# Patient Record
Sex: FEMALE | Race: WHITE | NOT HISPANIC OR LATINO | Employment: OTHER
[De-identification: names, ages, dates, MRNs, and addresses within clinical notes are randomized per-mention and may not be internally consistent; named-entity substitution may affect disease eponyms.]

---

## 2019-02-18 ENCOUNTER — RECORDS - HEALTHEAST (OUTPATIENT)
Dept: ADMINISTRATIVE | Facility: OTHER | Age: 74
End: 2019-02-18

## 2019-07-01 ENCOUNTER — HOSPITAL ENCOUNTER (OUTPATIENT)
Dept: ULTRASOUND IMAGING | Facility: CLINIC | Age: 74
Discharge: HOME | End: 2019-07-01
Attending: OBSTETRICS & GYNECOLOGY | Admitting: OBSTETRICS & GYNECOLOGY
Payer: MEDICARE

## 2019-07-01 DIAGNOSIS — R92.8 ABNORMAL MAMMOGRAM: ICD-10-CM

## 2019-07-01 PROCEDURE — 76642 ULTRASOUND BREAST LIMITED: CPT | Mod: RT

## 2019-07-27 ENCOUNTER — HOSPITAL ENCOUNTER (OUTPATIENT)
Age: 74
End: 2019-07-27
Attending: INTERNAL MEDICINE | Admitting: INTERNAL MEDICINE
Payer: MEDICARE

## 2019-07-27 ENCOUNTER — HOSPITAL ENCOUNTER (OUTPATIENT)
Facility: CLINIC | Age: 74
Setting detail: OBSERVATION
Discharge: HOME | End: 2019-07-28
Attending: EMERGENCY MEDICINE | Admitting: INTERNAL MEDICINE
Payer: MEDICARE

## 2019-07-27 ENCOUNTER — TRANSFERRED RECORDS (OUTPATIENT)
Dept: HEALTH INFORMATION MANAGEMENT | Facility: CLINIC | Age: 74
End: 2019-07-27

## 2019-07-27 DIAGNOSIS — K57.92 DIVERTICULITIS: Primary | ICD-10-CM

## 2019-07-27 DIAGNOSIS — K57.32 DIVERTICULITIS OF COLON: ICD-10-CM

## 2019-07-27 LAB
ANION GAP SERPL CALCULATED.3IONS-SCNC: 6 MMOL/L (ref 3–14)
BASOPHILS # BLD AUTO: 0 10E9/L (ref 0–0.2)
BASOPHILS NFR BLD AUTO: 0.2 %
BUN SERPL-MCNC: 31 MG/DL (ref 7–30)
CALCIUM SERPL-MCNC: 8.9 MG/DL (ref 8.5–10.1)
CHLORIDE SERPL-SCNC: 101 MMOL/L (ref 94–109)
CO2 SERPL-SCNC: 26 MMOL/L (ref 20–32)
CREAT SERPL-MCNC: 1.32 MG/DL (ref 0.52–1.04)
CREAT SERPL-MCNC: 1.5 MG/DL
DIFFERENTIAL METHOD BLD: ABNORMAL
EOSINOPHIL # BLD AUTO: 0 10E9/L (ref 0–0.7)
EOSINOPHIL NFR BLD AUTO: 0 %
ERYTHROCYTE [DISTWIDTH] IN BLOOD BY AUTOMATED COUNT: 13.9 % (ref 10–15)
GFR SERPL CREATININE-BSD FRML MDRD: 39 ML/MIN/{1.73_M2}
GLUCOSE SERPL-MCNC: 110 MG/DL (ref 70–99)
GLUCOSE SERPL-MCNC: 123 MG/DL (ref 70–99)
HCT VFR BLD AUTO: 39 % (ref 35–47)
HGB BLD-MCNC: 13.1 G/DL (ref 11.7–15.7)
IMM GRANULOCYTES # BLD: 0.1 10E9/L (ref 0–0.4)
IMM GRANULOCYTES NFR BLD: 0.4 %
LYMPHOCYTES # BLD AUTO: 0.9 10E9/L (ref 0.8–5.3)
LYMPHOCYTES NFR BLD AUTO: 6.5 %
MCH RBC QN AUTO: 31 PG (ref 26.5–33)
MCHC RBC AUTO-ENTMCNC: 33.6 G/DL (ref 31.5–36.5)
MCV RBC AUTO: 92 FL (ref 78–100)
MONOCYTES # BLD AUTO: 0.8 10E9/L (ref 0–1.3)
MONOCYTES NFR BLD AUTO: 6 %
NEUTROPHILS # BLD AUTO: 11.8 10E9/L (ref 1.6–8.3)
NEUTROPHILS NFR BLD AUTO: 86.9 %
NRBC # BLD AUTO: 0 10*3/UL
NRBC BLD AUTO-RTO: 0 /100
PLATELET # BLD AUTO: 338 10E9/L (ref 150–450)
POTASSIUM SERPL-SCNC: 3.1 MMOL/L (ref 3.4–5.3)
POTASSIUM SERPL-SCNC: 3.5 MMOL/L (ref 3.5–5.1)
RBC # BLD AUTO: 4.22 10E12/L (ref 3.8–5.2)
SODIUM SERPL-SCNC: 133 MMOL/L (ref 133–144)
WBC # BLD AUTO: 13.6 10E9/L (ref 4–11)

## 2019-07-27 PROCEDURE — 25000132 ZZH RX MED GY IP 250 OP 250 PS 637: Mod: GY | Performed by: INTERNAL MEDICINE

## 2019-07-27 PROCEDURE — 25800030 ZZH RX IP 258 OP 636: Performed by: INTERNAL MEDICINE

## 2019-07-27 PROCEDURE — G0378 HOSPITAL OBSERVATION PER HR: HCPCS

## 2019-07-27 PROCEDURE — 36415 COLL VENOUS BLD VENIPUNCTURE: CPT | Performed by: INTERNAL MEDICINE

## 2019-07-27 PROCEDURE — 25000128 H RX IP 250 OP 636: Performed by: EMERGENCY MEDICINE

## 2019-07-27 PROCEDURE — 80048 BASIC METABOLIC PNL TOTAL CA: CPT | Performed by: EMERGENCY MEDICINE

## 2019-07-27 PROCEDURE — 99285 EMERGENCY DEPT VISIT HI MDM: CPT | Mod: 25

## 2019-07-27 PROCEDURE — 83605 ASSAY OF LACTIC ACID: CPT | Performed by: INTERNAL MEDICINE

## 2019-07-27 PROCEDURE — 85025 COMPLETE CBC W/AUTO DIFF WBC: CPT | Performed by: EMERGENCY MEDICINE

## 2019-07-27 PROCEDURE — 99220 ZZC INITIAL OBSERVATION CARE,LEVL III: CPT | Performed by: INTERNAL MEDICINE

## 2019-07-27 PROCEDURE — 96365 THER/PROPH/DIAG IV INF INIT: CPT

## 2019-07-27 RX ORDER — HYDROMORPHONE HYDROCHLORIDE 1 MG/ML
0.2 INJECTION, SOLUTION INTRAMUSCULAR; INTRAVENOUS; SUBCUTANEOUS
Status: DISCONTINUED | OUTPATIENT
Start: 2019-07-27 | End: 2019-07-28

## 2019-07-27 RX ORDER — SODIUM CHLORIDE 9 MG/ML
INJECTION, SOLUTION INTRAVENOUS CONTINUOUS
Status: DISCONTINUED | OUTPATIENT
Start: 2019-07-27 | End: 2019-07-28 | Stop reason: HOSPADM

## 2019-07-27 RX ORDER — ASPIRIN 81 MG/1
81 TABLET, CHEWABLE ORAL AT BEDTIME
Status: ON HOLD | COMMUNITY
End: 2019-11-22

## 2019-07-27 RX ORDER — ACETAMINOPHEN 325 MG/1
650 TABLET ORAL EVERY 4 HOURS PRN
Status: DISCONTINUED | OUTPATIENT
Start: 2019-07-27 | End: 2019-07-28 | Stop reason: HOSPADM

## 2019-07-27 RX ORDER — POTASSIUM CHLORIDE 1500 MG/1
20-40 TABLET, EXTENDED RELEASE ORAL
Status: DISCONTINUED | OUTPATIENT
Start: 2019-07-27 | End: 2019-07-28 | Stop reason: HOSPADM

## 2019-07-27 RX ORDER — POTASSIUM CHLORIDE 7.45 MG/ML
10 INJECTION INTRAVENOUS
Status: DISCONTINUED | OUTPATIENT
Start: 2019-07-27 | End: 2019-07-28 | Stop reason: HOSPADM

## 2019-07-27 RX ORDER — POTASSIUM CHLORIDE 29.8 MG/ML
20 INJECTION INTRAVENOUS
Status: DISCONTINUED | OUTPATIENT
Start: 2019-07-27 | End: 2019-07-28 | Stop reason: HOSPADM

## 2019-07-27 RX ORDER — ERTAPENEM 1 G/1
1 INJECTION, POWDER, LYOPHILIZED, FOR SOLUTION INTRAMUSCULAR; INTRAVENOUS ONCE
Status: COMPLETED | OUTPATIENT
Start: 2019-07-27 | End: 2019-07-27

## 2019-07-27 RX ORDER — ONDANSETRON 2 MG/ML
4 INJECTION INTRAMUSCULAR; INTRAVENOUS EVERY 6 HOURS PRN
Status: DISCONTINUED | OUTPATIENT
Start: 2019-07-27 | End: 2019-07-28 | Stop reason: HOSPADM

## 2019-07-27 RX ORDER — HYDROCHLOROTHIAZIDE 12.5 MG/1
12.5 TABLET ORAL DAILY PRN
COMMUNITY
End: 2022-06-08

## 2019-07-27 RX ORDER — POTASSIUM CHLORIDE 1.5 G/1.58G
20-40 POWDER, FOR SOLUTION ORAL
Status: DISCONTINUED | OUTPATIENT
Start: 2019-07-27 | End: 2019-07-28 | Stop reason: HOSPADM

## 2019-07-27 RX ORDER — ACETAMINOPHEN 650 MG/1
650 SUPPOSITORY RECTAL EVERY 4 HOURS PRN
Status: DISCONTINUED | OUTPATIENT
Start: 2019-07-27 | End: 2019-07-28 | Stop reason: HOSPADM

## 2019-07-27 RX ORDER — CHLORAL HYDRATE 500 MG
1 CAPSULE ORAL DAILY
COMMUNITY

## 2019-07-27 RX ORDER — POTASSIUM CL/LIDO/0.9 % NACL 10MEQ/0.1L
10 INTRAVENOUS SOLUTION, PIGGYBACK (ML) INTRAVENOUS
Status: DISCONTINUED | OUTPATIENT
Start: 2019-07-27 | End: 2019-07-28 | Stop reason: HOSPADM

## 2019-07-27 RX ORDER — ERTAPENEM 1 G/1
1 INJECTION, POWDER, LYOPHILIZED, FOR SOLUTION INTRAMUSCULAR; INTRAVENOUS EVERY 24 HOURS
Status: DISCONTINUED | OUTPATIENT
Start: 2019-07-28 | End: 2019-07-27

## 2019-07-27 RX ORDER — AMLODIPINE BESYLATE 5 MG/1
5 TABLET ORAL EVERY EVENING
COMMUNITY
End: 2019-11-20

## 2019-07-27 RX ORDER — ONDANSETRON 4 MG/1
4 TABLET, ORALLY DISINTEGRATING ORAL EVERY 6 HOURS PRN
Status: DISCONTINUED | OUTPATIENT
Start: 2019-07-27 | End: 2019-07-28 | Stop reason: HOSPADM

## 2019-07-27 RX ORDER — NALOXONE HYDROCHLORIDE 0.4 MG/ML
.1-.4 INJECTION, SOLUTION INTRAMUSCULAR; INTRAVENOUS; SUBCUTANEOUS
Status: DISCONTINUED | OUTPATIENT
Start: 2019-07-27 | End: 2019-07-28 | Stop reason: HOSPADM

## 2019-07-27 RX ORDER — MULTIVITAMIN WITH IRON
1 TABLET ORAL EVERY EVENING
COMMUNITY

## 2019-07-27 RX ORDER — FAMOTIDINE 20 MG/1
20 TABLET, FILM COATED ORAL DAILY
COMMUNITY

## 2019-07-27 RX ADMIN — SODIUM CHLORIDE 1000 ML: 9 INJECTION, SOLUTION INTRAVENOUS at 22:08

## 2019-07-27 RX ADMIN — SODIUM CHLORIDE: 9 INJECTION, SOLUTION INTRAVENOUS at 23:41

## 2019-07-27 RX ADMIN — POTASSIUM CHLORIDE 40 MEQ: 1500 TABLET, EXTENDED RELEASE ORAL at 23:49

## 2019-07-27 RX ADMIN — ERTAPENEM SODIUM 1 G: 1 INJECTION, POWDER, LYOPHILIZED, FOR SOLUTION INTRAMUSCULAR; INTRAVENOUS at 22:08

## 2019-07-27 ASSESSMENT — MIFFLIN-ST. JEOR: SCORE: 1383.57

## 2019-07-27 ASSESSMENT — ENCOUNTER SYMPTOMS
ABDOMINAL PAIN: 1
FEVER: 0

## 2019-07-28 VITALS
HEIGHT: 63 IN | OXYGEN SATURATION: 95 % | DIASTOLIC BLOOD PRESSURE: 62 MMHG | TEMPERATURE: 98.2 F | WEIGHT: 202.7 LBS | BODY MASS INDEX: 35.91 KG/M2 | SYSTOLIC BLOOD PRESSURE: 103 MMHG | RESPIRATION RATE: 16 BRPM | HEART RATE: 99 BPM

## 2019-07-28 LAB
ANION GAP SERPL CALCULATED.3IONS-SCNC: 5 MMOL/L (ref 3–14)
BUN SERPL-MCNC: 25 MG/DL (ref 7–30)
CALCIUM SERPL-MCNC: 8.3 MG/DL (ref 8.5–10.1)
CHLORIDE SERPL-SCNC: 106 MMOL/L (ref 94–109)
CO2 SERPL-SCNC: 26 MMOL/L (ref 20–32)
CREAT SERPL-MCNC: 1.16 MG/DL (ref 0.52–1.04)
GFR SERPL CREATININE-BSD FRML MDRD: 46 ML/MIN/{1.73_M2}
GLUCOSE SERPL-MCNC: 107 MG/DL (ref 70–99)
LACTATE BLD-SCNC: 1.2 MMOL/L (ref 0.7–2)
POTASSIUM SERPL-SCNC: 4.2 MMOL/L (ref 3.4–5.3)
SODIUM SERPL-SCNC: 137 MMOL/L (ref 133–144)

## 2019-07-28 PROCEDURE — 96375 TX/PRO/DX INJ NEW DRUG ADDON: CPT

## 2019-07-28 PROCEDURE — 80048 BASIC METABOLIC PNL TOTAL CA: CPT | Performed by: INTERNAL MEDICINE

## 2019-07-28 PROCEDURE — 96361 HYDRATE IV INFUSION ADD-ON: CPT

## 2019-07-28 PROCEDURE — 36415 COLL VENOUS BLD VENIPUNCTURE: CPT | Performed by: INTERNAL MEDICINE

## 2019-07-28 PROCEDURE — 25000132 ZZH RX MED GY IP 250 OP 250 PS 637: Mod: GY | Performed by: INTERNAL MEDICINE

## 2019-07-28 PROCEDURE — 99217 ZZC OBSERVATION CARE DISCHARGE: CPT | Performed by: INTERNAL MEDICINE

## 2019-07-28 PROCEDURE — 25000128 H RX IP 250 OP 636: Performed by: INTERNAL MEDICINE

## 2019-07-28 PROCEDURE — G0378 HOSPITAL OBSERVATION PER HR: HCPCS

## 2019-07-28 RX ORDER — OXYCODONE HYDROCHLORIDE 5 MG/1
5-10 TABLET ORAL
Status: DISCONTINUED | OUTPATIENT
Start: 2019-07-28 | End: 2019-07-28 | Stop reason: HOSPADM

## 2019-07-28 RX ORDER — ASPIRIN 81 MG/1
81 TABLET, CHEWABLE ORAL AT BEDTIME
Status: DISCONTINUED | OUTPATIENT
Start: 2019-07-28 | End: 2019-07-28 | Stop reason: HOSPADM

## 2019-07-28 RX ORDER — AMLODIPINE BESYLATE 5 MG/1
5 TABLET ORAL EVERY EVENING
Status: DISCONTINUED | OUTPATIENT
Start: 2019-07-28 | End: 2019-07-28 | Stop reason: HOSPADM

## 2019-07-28 RX ORDER — OXYCODONE HYDROCHLORIDE 5 MG/1
5 TABLET ORAL EVERY 6 HOURS PRN
Qty: 10 TABLET | Refills: 0 | Status: ON HOLD | OUTPATIENT
Start: 2019-07-28 | End: 2019-11-21

## 2019-07-28 RX ORDER — FAMOTIDINE 20 MG/1
20 TABLET, FILM COATED ORAL DAILY PRN
Status: DISCONTINUED | OUTPATIENT
Start: 2019-07-28 | End: 2019-07-28 | Stop reason: HOSPADM

## 2019-07-28 RX ORDER — ONDANSETRON 4 MG/1
4 TABLET, ORALLY DISINTEGRATING ORAL EVERY 6 HOURS PRN
Qty: 10 TABLET | Refills: 0 | Status: ON HOLD | OUTPATIENT
Start: 2019-07-28 | End: 2019-11-21

## 2019-07-28 RX ADMIN — OXYCODONE HYDROCHLORIDE 5 MG: 5 TABLET ORAL at 10:47

## 2019-07-28 RX ADMIN — AMOXICILLIN AND CLAVULANATE POTASSIUM 1 TABLET: 875; 125 TABLET, FILM COATED ORAL at 09:09

## 2019-07-28 RX ADMIN — HYDROMORPHONE HYDROCHLORIDE 0.2 MG: 1 INJECTION, SOLUTION INTRAMUSCULAR; INTRAVENOUS; SUBCUTANEOUS at 04:57

## 2019-07-28 RX ADMIN — POTASSIUM CHLORIDE 20 MEQ: 1500 TABLET, EXTENDED RELEASE ORAL at 02:25

## 2019-07-28 NOTE — DISCHARGE SUMMARY
[facility]  Discharge Summary  Name: Susanne Escoto    MRN: [id number]  YOB: 1945    Age: 73 year old  Date of Discharge:  7/28/2019  Date of Admission: 7/27/2019  Primary Care Provider: Stephanie Seymour  Discharge Physician:  Richelle Amor MD  Discharging Service:  Hospitalist      Discharge Diagnoses:  1.  Acute diverticulitis  2.  Hypertension  3.  GERD  4.  Hypokalemia  5.  Acute kidney injury     Follow-ups Needed After Discharge   Follow-up with primary care doctor within 2 weeks    Unresulted Labs Ordered in the Past 30 Days of this Admission     No orders found from 10/16/2018 to 12/16/2018.        Hospital Course:  Susanne Escoto is a 73-year-old female with history of hypertension and GERD who was admitted on 7/27/2019 from urgent care with abdominal pain, nausea and vomiting and was found to have diverticulitis.    Acute diverticulitis: Patient developed severe abdominal pain in the lower abdominal region with nausea and emesis.  She was seen in urgent care and had a CT scan there which showed mild acute diverticulitis of the mid sigmoid colon with no evidence of perforation or collection.  She was sent to the emergency room and subsequently admitted to the hospital.  She was given a dose of ertapenem in the emergency room.  The morning of discharge she was started on oral Augmentin to treat her diverticulitis.  She was started on oral oxycodone for pain control.  She was able to tolerate a diet.  She will discharge home on Augmentin to treat her diverticulitis.  She will need to follow-up with primary care doctor within 2 weeks to ensure resolution of her symptoms.  Can also discuss possible colonoscopy.    History of hypertension: Prior to admission the patient was on hydrochlorothiazide and amlodipine.  Her blood pressure was relatively soft so medications were held.  She was instructed to check her blood pressure daily and resume amlodipine once her systolic pressure  "is 125 or greater.  She was instructed to hold her hydrochlorothiazide until she is eating and drinking normally.    GERD: Continue famotidine upon discharge.    Acute kidney injury: Baseline creatinine is approximately 1.  On admission creatinine was 1.32.  This has improved to 1.16 with IV fluids.  As above her hydrochlorothiazide is being held until she is back to eating and drinking normally.     Discharge Disposition:  Discharged to home, her daughter will be staying with her for at least the next 24 hours     Allergies:  No Known Allergies     Condition on Discharge:  Discharge condition: Stable   Discharge vitals: Blood pressure 107/64, pulse 99, temperature 97.3  F (36.3  C), temperature source Oral, resp. rate 20, height 1.6 m (5' 3\"), weight 91.9 kg (202 lb 11.2 oz), SpO2 99 %.   Code status on discharge: Full Code     History of Illness:  See detailed admission note for full details.    Physical Exam:  Blood pressure 107/64, pulse 99, temperature 97.3  F (36.3  C), temperature source Oral, resp. rate 20, height 1.6 m (5' 3\"), weight 91.9 kg (202 lb 11.2 oz), SpO2 99 %.  Wt Readings from Last 1 Encounters:   07/27/19 91.9 kg (202 lb 11.2 oz)     General: Alert, awake, no acute distress.  HEENT: Normocephalic, atraumatic, eyes anicteric and without scleral injection, EOMI, MMM.  Cardiac: RRR, normal S1, S2.  No m/g/r. No LE edema.  Pulmonary: Normal chest rise, normal work of breathing.  Lungs CTAB  Abdomen: soft, mild lower quadrant tenderness, non-distended.  Normoactive BS.  No guarding or rebound tenderness.  Extremities: no deformities.  Warm, well perfused.  Skin: no rashes or lesions noted.  Warm and Dry.  Neuro: No focal deficits noted.  Speech clear.  Coordination and strength grossly normal.  Psych: Appropriate affect. Alert and oriented x3    Procedures other than Imaging:  IV antibiotics, IVF     Imaging:  CT Abdomen Pelvis WO (07/27/2019): from outside   1.  Mild acute diverticulitis of the " mid sigmoid colon with no evidence of  perforation or collection.  2.  Moderate hiatal hernia.     Consultations:  Consultations This Hospital Stay   None     Recent Lab Results:  Recent Labs   Lab 07/27/19  2154   WBC 13.6*   HGB 13.1   HCT 39.0   MCV 92        Recent Labs   Lab 07/28/19  0622 07/27/19  2154    133   POTASSIUM 4.2 3.1*   CHLORIDE 106 101   CO2 26 26   ANIONGAP 5 6   * 110*   BUN 25 31*   CR 1.16* 1.32*   GFRESTIMATED 46* 39*   GFRESTBLACK 53* 45*   PJ 8.3* 8.9          Pending Results:    Unresulted Labs Ordered in the Past 30 Days of this Admission     No orders found for last 31 day(s).           Discharge Instructions and Follow-Up:   Discharge Orders      Reason for your hospital stay    You were hospitalized for abdominal pain and nausea and were found to have acute diverticulitis.  You will complete a course of antibiotics to treat this.     Follow-up and recommended labs and tests     Follow up with primary care provider, Stephanie Seymour, within 7 days for hospital follow- up.  No follow up labs or test are needed.     Activity    Your activity upon discharge: activity as tolerated     Discharge Instructions    1.  Your blood pressure is slightly low due to your current infection.  Please do not take your blood pressure medications tonight.  2.  Check your blood pressure tomorrow.  If the top number of your blood pressure reading is 125 or higher you can take your amlodipine.  3.  Do not take your hydrochlorothiazide until you are back to eating and drinking normally is taking this while you have your infection and decreased oral intake could lead to dehydration.     Full Code     Diet    Follow this diet upon discharge: Low fiber until resolution of diverticulitis then high fiber diet.     Discharge Medications   Current Discharge Medication List      START taking these medications    Details   amoxicillin-clavulanate (AUGMENTIN) 875-125 MG tablet Take 1 tablet by mouth  every 12 hours  Qty: 19 tablet, Refills: 0    Associated Diagnoses: Diverticulitis      ondansetron (ZOFRAN-ODT) 4 MG ODT tab Take 1 tablet (4 mg) by mouth every 6 hours as needed for nausea or vomiting  Qty: 10 tablet, Refills: 0    Associated Diagnoses: Diverticulitis      oxyCODONE (ROXICODONE) 5 MG tablet Take 1 tablet (5 mg) by mouth every 6 hours as needed for moderate to severe pain  Qty: 10 tablet, Refills: 0    Associated Diagnoses: Diverticulitis         CONTINUE these medications which have NOT CHANGED    Details   amLODIPine (NORVASC) 5 MG tablet Take 5 mg by mouth every evening      aspirin (ASA) 81 MG chewable tablet Take 81 mg by mouth At Bedtime      diphenhydrAMINE-acetaminophen (TYLENOL PM)  MG tablet Take 1 tablet by mouth nightly as needed for sleep      famotidine (PEPCID) 20 MG tablet Take 20 mg by mouth daily as needed      fish oil-omega-3 fatty acids 1000 MG capsule Take 2 g by mouth 3 times daily      hydrochlorothiazide (HYDRODIURIL) 12.5 MG tablet Take 25 mg by mouth daily      magnesium 250 MG tablet Take 1 tablet by mouth daily      Misc Natural Products (OSTEO BI-FLEX ADV JOINT SHIELD PO) Take 1 tablet by mouth daily      vitamin D3 (CHOLECALCIFEROL) 1000 units (25 mcg) tablet Take 1,000 Units by mouth daily             Time Spent on this Encounter   I, Richelle Amor, personally saw the patient today and spent greater than 30 minutes discharging this patient.    Richelle Amor MD

## 2019-07-28 NOTE — PHARMACY-ADMISSION MEDICATION HISTORY
Admission medication history interview status for this patient is complete. See Psychiatric admission navigator for allergy information, prior to admission medications and immunization status.     Medication history interview source(s):Patient and daughter  Medication history resources (including written lists, pill bottles, clinic record):written list    Changes made to PTA medication list:  Added: all  Deleted: none  Changed: none    Actions taken by pharmacist (provider contacted, etc):None     Additional medication history information:None    Medication reconciliation/reorder completed by provider prior to medication history? No    For patients on insulin therapy: no (Yes/No)   Lantus/levemir/NPH/Mix 70/30 dose: ___ in AM/PM or twice daily   Sliding scale Novolog Y/N   If Yes, do you have a baseline novolog pre-meal dose: ______units with meals   Patients eat three meals a day: Y/N ---  How many episodes of hypoglycemia (low blood glucose) do you have weekly: ---   How many missed doses do you have a week: ---  How many times do you check your blood glucose per day: ---  Any Barriers to therapy: cost of medications/comfortable with giving injections (if applicable)/ comfortable and confident with current diabetes regimen ---      Prior to Admission medications    Medication Sig Last Dose Taking? Auth Provider   amLODIPine (NORVASC) 5 MG tablet Take 5 mg by mouth every evening 7/26/2019 at Unknown time Yes Unknown, Entered By History   aspirin (ASA) 81 MG chewable tablet Take 81 mg by mouth At Bedtime 7/26/2019 at Unknown time Yes Unknown, Entered By History   diphenhydrAMINE-acetaminophen (TYLENOL PM)  MG tablet Take 1 tablet by mouth nightly as needed for sleep Past Month at Unknown time Yes Unknown, Entered By History   famotidine (PEPCID) 20 MG tablet Take 20 mg by mouth daily as needed  Yes Unknown, Entered By History   fish oil-omega-3 fatty acids 1000 MG capsule Take 2 g by mouth 3 times daily 7/26/2019 at  Unknown time Yes Unknown, Entered By History   hydrochlorothiazide (HYDRODIURIL) 12.5 MG tablet Take 25 mg by mouth daily 7/27/2019 at am Yes Unknown, Entered By History   magnesium 250 MG tablet Take 1 tablet by mouth daily 7/26/2019 at Unknown time Yes Unknown, Entered By History   Misc Natural Products (OSTEO BI-FLEX ADV JOINT SHIELD PO) Take 1 tablet by mouth daily Past Week at Unknown time Yes Unknown, Entered By History   vitamin D3 (CHOLECALCIFEROL) 1000 units (25 mcg) tablet Take 1,000 Units by mouth daily 7/27/2019 at am Yes Unknown, Entered By History

## 2019-07-28 NOTE — ED TRIAGE NOTES
Here for bilateral mid abdominal pain started 2pm and getting worst. Went to urgent care, had CT scan and blood work done that shows diverticulitis and elevated WBC, advised to come to ED for IV antibiotics. Dilaudid given at urgent care that help with pain. ABCs intact.

## 2019-07-28 NOTE — H&P
[facility]    History and Physical - Hospitalist Service       Date of Admission:  7/27/2019    Assessment & Plan   Susanne Escoto is a 73 year old female admitted on 7/27/2019. She has a past medical history significant for hypertension and GERD.  She presented to outside urgent care with abdominal pain.  Found to have acute diverticulitis.    1.  Acute diverticulitis.  Was started on IV ertapenem in the emergency room.  Continue for now, but likely able to change to oral Augmentin tomorrow.  Start continuous IV fluids.  Pain medications as needed.  N.p.o. Initially.    2.  Hypertension.  Hold amlodipine and hydrochlorthiazide initially.    3.  GERD.  Start IV famotidine while n.p.o.    4.  Hypokalemia.  Start potassium replacement protocol.    5.  Acute kidney injury.  Start continuous IV fluids.  Avoid nephrotoxins as able.     Diet: NPO  DVT Prophylaxis: Ambulate every shift  Kramer Catheter: not present  Code Status: Full code    Disposition Plan   Expected discharge: Tomorrow, recommended to prior living arrangement   Entered: Geoffrey Greene DO 07/27/2019, 10:58 PM         Geoffrey Greene DO  [facility]    ______________________________________________________________________    Chief Complaint   Abdominal pain.    History is obtained from the patient    History of Present Illness   Susanne Escoto is a 73 year old female who has a past medical history significant for hypertension and GERD.  She developed severe acute abdominal pain earlier this morning.  Pain was located across the lower sections of her abdomen.  She also developed nausea.  Did vomit one time with this.  Has felt chills.  Has not noticed fevers.  Has never had pain like this previously.  Nothing at home was helping pain.  She did present to an outside urgent care for further evaluation.  Pain medications given in the ER have helped pain.  She is no longer having nausea.  Has not had any problems  with diarrhea or constipation recently.  No dysuria.  No other complaints.  CT scan at outside facility showed diverticulitis.  She was sent to emergency room for further evaluation.    Review of Systems    The 10 point Review of Systems is negative other than noted in the HPI     Past Medical History    I have reviewed this patient's medical history and updated it with pertinent information if needed.   No past medical history on file.    Past Surgical History   I have reviewed this patient's surgical history and updated it with pertinent information if needed.  No past surgical history on file.    Social History   I have reviewed this patient's social history and updated it with pertinent information if needed.  Social History     Tobacco Use     Smoking status: Not on file   Substance Use Topics     Alcohol use: Not on file     Drug use: Not on file       Family History   I have reviewed this patient's family history and updated it with pertinent information if needed.   Father with cholangiocarcinoma.  Mother with stroke.  Sister with breast cancer.    Prior to Admission Medications   Prior to Admission Medications   Prescriptions Last Dose Informant Patient Reported? Taking?   Misc Natural Products (OSTEO BI-FLEX ADV JOINT SHIELD PO) Past Week at Unknown time  Yes Yes   Sig: Take 1 tablet by mouth daily   amLODIPine (NORVASC) 5 MG tablet 7/26/2019 at Unknown time  Yes Yes   Sig: Take 5 mg by mouth every evening   aspirin (ASA) 81 MG chewable tablet 7/26/2019 at Unknown time  Yes Yes   Sig: Take 81 mg by mouth At Bedtime   diphenhydrAMINE-acetaminophen (TYLENOL PM)  MG tablet Past Month at Unknown time  Yes Yes   Sig: Take 1 tablet by mouth nightly as needed for sleep   famotidine (PEPCID) 20 MG tablet   Yes Yes   Sig: Take 20 mg by mouth daily as needed   fish oil-omega-3 fatty acids 1000 MG capsule 7/26/2019 at Unknown time  Yes Yes   Sig: Take 2 g by mouth 3 times daily   hydrochlorothiazide  (HYDRODIURIL) 12.5 MG tablet 7/27/2019 at am  Yes Yes   Sig: Take 25 mg by mouth daily   magnesium 250 MG tablet 7/26/2019 at Unknown time  Yes Yes   Sig: Take 1 tablet by mouth daily   vitamin D3 (CHOLECALCIFEROL) 1000 units (25 mcg) tablet 7/27/2019 at am  Yes Yes   Sig: Take 1,000 Units by mouth daily      Facility-Administered Medications: None     Allergies   No Known Allergies    Physical Exam   Vital Signs: Temp: 98.9  F (37.2  C) Temp src: Oral BP: 133/90 Pulse: 99 Heart Rate: 108 Resp: 16 SpO2: 97 % O2 Device: None (Room air)    Weight: 0 lbs 0 oz    Gen:  NAD, A&Ox3.  Eyes:  PERRL, sclera anicteric.  OP:  MMM, no lesions.  Neck:  Supple.  CV:  Regular, no murmurs.  Lung:  CTA b/l, normal effort.  Ab:  +BS, soft.  Skin:  Warm, dry to touch.  No rash.  Ext:  No pitting edema LE b/l.      Data   Data reviewed today: I reviewed all medications, new labs and imaging results over the last 24 hours.     Recent Labs   Lab 07/27/19  2154   WBC 13.6*   HGB 13.1   MCV 92         POTASSIUM 3.1*   CHLORIDE 101   CO2 26   BUN 31*   CR 1.32*   ANIONGAP 6   PJ 8.9   *

## 2019-07-28 NOTE — PLAN OF CARE
ROOM # 206-2    Living Situation (if not independent, order SW consult): Ind  [facility] name:  [facility]    Activity level at baseline: Ind  Activity level on admit: SBA      Patient registered to observation; given Patient Bill of Rights; given the opportunity to ask questions about observation status and their plan of care.  Patient has been oriented to the observation room, bathroom and call light is in place.    Discussed discharge goals and expectations with patient/family.

## 2019-07-28 NOTE — PLAN OF CARE
PRIMARY DIAGNOSIS: ACUTE ABDOMINAL PAIN  OUTPATIENT/OBSERVATION GOALS TO BE MET BEFORE DISCHARGE:  1. Pain Status: Improved but still requiring IV narcotics.     2. Return to near baseline physical activity: Yes     3. Cleared for discharge by consultants (if involved): N/A     Discharge Planner Nurse   Safe discharge environment identified: Yes  Barriers to discharge: Yes- abdominal pain, K level 3.1         K level replaced. Denied having any pain overnight.    Entered by: Michale Strickland 07/28/2019 4:30 AM  Please review provider order for any additional goals.   Nurse to notify provider when observation goals have been met and patient is ready for discharge.

## 2019-07-28 NOTE — PLAN OF CARE
PRIMARY DIAGNOSIS: ACUTE ABDOMINAL PAIN  OUTPATIENT/OBSERVATION GOALS TO BE MET BEFORE DISCHARGE:  1. Pain Status: Improved but still requiring IV narcotics.     2. Return to near baseline physical activity: Yes     3. Cleared for discharge by consultants (if involved):Yes     Discharge Planner Nurse   Safe discharge environment identified: Yes  Barriers to discharge: Yes- abdominal pain, K level 4.2     VSS stable and on room air. Patient was transitioned to oral pain meds and ABX today, tolerated well. Patient rates pain 6/10 oxy given patient then rated 3/10. Oral Augmentin started today. IV saline locked. Patient will discharge with oral ABX and pain meds.     Entered by: Pina Watkins July 28, 2019  Please review provider order for any additional goals.   Nurse to notify provider when observation goals have been met and patient is ready for discharge.

## 2019-07-28 NOTE — DISCHARGE INSTRUCTIONS
1.  Your blood pressure is slightly low due to your current infection.  Please do not take your blood pressure medications tonight.  2.  Check your blood pressure tomorrow.  If the top number of your blood pressure reading is 125 or higher you can take your amlodipine.  3.  Do not take your hydrochlorothiazide until you are back to eating and drinking normally is taking this while you have your infection and decreased oral intake could lead to dehydration.  4.  Follow a low fiber diet while you are being treated for acute diverticulitis.  Once you have recovered from this increase your fiber intake to try to prevent further episodes of diverticulitis.  5.  Follow-up with your primary care doctor within 1 week to ensure that you are continuing to improve.  6.  Take Tylenol as needed for pain.  You can take oxycodone for severe pain.  This medication can cause mild confusion and sedation.  I do recommend that you have somebody with you at least for the first 24 hours after discharge.

## 2019-07-28 NOTE — PLAN OF CARE
PRIMARY DIAGNOSIS: ACUTE ABDOMINAL PAIN  OUTPATIENT/OBSERVATION GOALS TO BE MET BEFORE DISCHARGE:  1. Pain Status: Improved but still requiring IV narcotics.     2. Return to near baseline physical activity: Yes     3. Cleared for discharge by consultants (if involved):Yes     Discharge Planner Nurse   Safe discharge environment identified: Yes  Barriers to discharge: Yes- abdominal pain, K level 4.2    VSS stable and on room air. Patient was transitioned to oral pain meds and ABX today, tolerated well. Patient rates pain 6/10 oxy given patient then rated 3/10. Oral Augmentin started today. IV saline locked.      Entered by: Pina Watkins July 28, 2019  Please review provider order for any additional goals.   Nurse to notify provider when observation goals have been met and patient is ready for discharge.

## 2019-07-28 NOTE — PLAN OF CARE
PRIMARY DIAGNOSIS: ACUTE ABDOMINAL PAIN  OUTPATIENT/OBSERVATION GOALS TO BE MET BEFORE DISCHARGE:  1. Pain Status: Improved but still requiring IV narcotics.    2. Return to near baseline physical activity: Yes    3. Cleared for discharge by consultants (if involved): N/A    Discharge Planner Nurse   Safe discharge environment identified: Yes  Barriers to discharge: Yes- abdominal pain, K level 3.1       Entered by: Michael Strickalnd 07/28/2019 12:31 AM     Please review provider order for any additional goals.   Nurse to notify provider when observation goals have been met and patient is ready for discharge.

## 2019-07-28 NOTE — PLAN OF CARE
Patient's After Visit Summary was reviewed with patient and/or family.   Patient verbalized understanding of After Visit Summary, recommended follow up and was given an opportunity to ask questions.   Discharge medications sent home with patient/family: YES, education given  Discharged with daughter.    OBSERVATION patient END time: 4:27 PM

## 2019-07-28 NOTE — ED PROVIDER NOTES
"History     Chief Complaint:  Abdominal Pain    HPI  Susanne Escoto is a 73 year old female with a history of lung cancer, hypertension, and chronic kidney disease who presents to the emergency department today for evaluation of abdominal pain. The patient was hospitalized recently for sepsis due to UTI. The patient reports a \"horrible\" pain in her lower abdomen that began this morning. She was evaluated at an [place] urgent care clinic where she was given Dilaudid for her pain and a CT scan was completed with results below. She denies any fevers, vomiting, diarrhea, prior abdominal surgeries.     CT Abdomen Pelvis WO (07/27/2019):  1.  Mild acute diverticulitis of the mid sigmoid colon with no evidence of  perforation or collection.  2.  Moderate hiatal hernia.    Allergies:  No Known Drug Allergies    Medications:    Aspirin  Tylenol   Pepcid  Norvasc    Past Medical History:    Bacteremia due to Gram-negative bacteria   Sepsis due to urinary tract infection   Acute cystitis with hematuria   Hypokalemia   Hyponatremia   Acute kidney injury   Chronic pain of both knees   Bilateral primary osteoarthritis of knee   Ganglion cyst of finger of right hand  History of tobacco abuse   Contact dermatitis and other eczema, due to unspecified cause   Chronic kidney disease   Menopause   Gastroesophageal reflux disease  Hypertension  Hyperlipidemia, mixed  Facial twitching   Edema extremities  Non-small cell carcinoma of lung    Past Surgical History:    UT repair flex tendon zone 2 hand, left     Lipoma resection, left neck    Esophagogastroduodenoscopy        Thoracotomy  Lobectomy, grade 2 adenocarcinoma    Cyst removal       Family History:    The patient's family history includes cancer in her sister and father; stroke in her mother.     Social History:  The patient is a former smoker that quit in 1993. She has never used smokeless tobacco. She does drink alcohol socially.   PCP: Stephanie Seymour    Review of " Systems   Constitutional: Negative for fever.   Gastrointestinal: Positive for abdominal pain (lower).   All other systems reviewed and are negative.    Physical Exam     Patient Vitals for the past 24 hrs:   BP Temp Temp src Pulse Heart Rate Resp SpO2   07/27/19 2215 134/80 -- -- 101 -- -- 99 %   07/27/19 2200 128/84 -- -- 94 -- -- 96 %   07/27/19 2116 108/78 98.9  F (37.2  C) Oral 108 108 18 95 %     Physical Exam  VS: Reviewed per above  HENT: Mucous membranes moist  EYES: sclera anicteric  CV: Rate as noted,  regular rhythm.   RESP: Effort normal. Breath sounds are normal bilaterally.  GI: no tenderness/rebound/guarding (after pta dilaudid), not clearly distended.  NEURO: Alert, moving all extremities  MSK: No deformity of the extremities  SKIN: Warm and dry    Emergency Department Course     Laboratory:  Laboratory findings were communicated with the patient who voiced understanding of the findings.    CBC: WBC 13.6 (H), HGB 13.1,   BMP: Potassium 3.1 (L), Glucose 110 (H), Urea Nitrogen 31 (H), Creatinine 1.32 (H), GFR Estimate 39 (L), o/w WNL     Interventions:  2208 NS 1000 mL IV   2208 Invanz 1 g IV    Emergency Department Course:  2138 Nursing notes and vitals reviewed.  2143 I performed a physical examination of the patient as documented above.  2154 IV was inserted and blood was drawn for laboratory testing, results above.  2222 I spoke with Dr. Greene of the hospitalist service from [facility] regarding patient's presentation, findings, and plan of care.  2228 I personally reviewed the laboratory results with the patient and answered all related questions prior to admission.    Impression & Plan      Medical Decision Making:  Susanne Escoto is a 73 year old female who presents from  with abdominal pain concerning for diverticulitis. CT from  confirms mild sigmoid diverticulitis without abscess or perforation. Her pain has been controlled by the interventions prior to arrival. She  has a non-surgical exam, but given age and severity of symptoms, pt would prefer admission.  Labs with WBC 13 but no fever or signs SIRS. She was given ertapenem and IVF bolus. She remained stable prior to admission.      Diagnosis:    ICD-10-CM    1. Diverticulitis of colon K57.32 Basic metabolic panel     Disposition:   The patient is admitted into the care of Dr. Greene.    Scribe Disclosure:  I, Contreras Wiseman, am serving as a scribe at 9:21 PM on 7/27/2019 to document services personally performed by Jake Yepez MD based on my observations and the provider's statements to me.    [facility] EMERGENCY DEPARTMENT         Jake Yepez MD  07/27/19 4442

## 2019-08-29 ENCOUNTER — HOSPITAL ENCOUNTER (OUTPATIENT)
Dept: LAB | Facility: CLINIC | Age: 74
Discharge: HOME | End: 2019-08-29
Attending: ORTHOPAEDIC SURGERY | Admitting: ORTHOPAEDIC SURGERY
Payer: MEDICARE

## 2019-08-29 DIAGNOSIS — Z00.00 ROUTINE GENERAL MEDICAL EXAMINATION AT A HEALTH CARE FACILITY: Primary | ICD-10-CM

## 2019-08-29 LAB — HGB BLD-MCNC: 13.6 G/DL (ref 11.7–15.7)

## 2019-08-29 PROCEDURE — 36415 COLL VENOUS BLD VENIPUNCTURE: CPT | Performed by: ORTHOPAEDIC SURGERY

## 2019-08-29 PROCEDURE — 85018 HEMOGLOBIN: CPT | Performed by: ORTHOPAEDIC SURGERY

## 2019-10-31 ENCOUNTER — HOSPITAL ENCOUNTER (OUTPATIENT)
Dept: LAB | Facility: CLINIC | Age: 74
Discharge: HOME | End: 2019-10-31
Attending: ORTHOPAEDIC SURGERY | Admitting: ORTHOPAEDIC SURGERY
Payer: MEDICARE

## 2019-10-31 DIAGNOSIS — Z01.812 PRE-OPERATIVE LABORATORY EXAMINATION: ICD-10-CM

## 2019-10-31 LAB
MRSA DNA SPEC QL NAA+PROBE: NEGATIVE
SPECIMEN SOURCE: NORMAL

## 2019-10-31 PROCEDURE — 87641 MR-STAPH DNA AMP PROBE: CPT | Performed by: ORTHOPAEDIC SURGERY

## 2019-10-31 PROCEDURE — 40000829 ZZHCL STATISTIC STAPH AUREUS SUSCEPT SCREEN PCR: Performed by: ORTHOPAEDIC SURGERY

## 2019-10-31 PROCEDURE — 87640 STAPH A DNA AMP PROBE: CPT | Performed by: ORTHOPAEDIC SURGERY

## 2019-10-31 PROCEDURE — 40000830 ZZHCL STATISTIC STAPH AUREUS METH RESIST SCREEN PCR: Performed by: ORTHOPAEDIC SURGERY

## 2019-11-20 RX ORDER — VIT C/B6/B5/MAGNESIUM/HERB 173 50-5-6-5MG
500 CAPSULE ORAL
COMMUNITY
End: 2022-06-08

## 2019-11-20 RX ORDER — AMLODIPINE BESYLATE 5 MG/1
5 TABLET ORAL DAILY PRN
COMMUNITY
End: 2022-06-08

## 2019-11-20 RX ORDER — MULTIPLE VITAMINS W/ MINERALS TAB 9MG-400MCG
1 TAB ORAL EVERY MORNING
COMMUNITY

## 2019-11-20 RX ORDER — ACETAMINOPHEN 500 MG
1000 TABLET ORAL 2 TIMES DAILY
Status: ON HOLD | COMMUNITY
End: 2019-11-22

## 2019-11-20 RX ORDER — PHENOL 1.4 %
10 AEROSOL, SPRAY (ML) MUCOUS MEMBRANE
COMMUNITY

## 2019-11-20 RX ORDER — NITROFURANTOIN 25; 75 MG/1; MG/1
100 CAPSULE ORAL DAILY
COMMUNITY
End: 2022-06-08

## 2019-11-21 ENCOUNTER — APPOINTMENT (OUTPATIENT)
Dept: GENERAL RADIOLOGY | Facility: CLINIC | Age: 74
End: 2019-11-21
Attending: ORTHOPAEDIC SURGERY
Payer: MEDICARE

## 2019-11-21 ENCOUNTER — APPOINTMENT (OUTPATIENT)
Dept: PHYSICAL THERAPY | Facility: CLINIC | Age: 74
End: 2019-11-21
Attending: ORTHOPAEDIC SURGERY
Payer: MEDICARE

## 2019-11-21 ENCOUNTER — HOSPITAL ENCOUNTER (OUTPATIENT)
Facility: CLINIC | Age: 74
Discharge: HOME | End: 2019-11-22
Attending: ORTHOPAEDIC SURGERY | Admitting: ORTHOPAEDIC SURGERY
Payer: MEDICARE

## 2019-11-21 ENCOUNTER — ANESTHESIA (OUTPATIENT)
Dept: SURGERY | Facility: CLINIC | Age: 74
End: 2019-11-21
Payer: MEDICARE

## 2019-11-21 ENCOUNTER — ANESTHESIA EVENT (OUTPATIENT)
Dept: SURGERY | Facility: CLINIC | Age: 74
End: 2019-11-21
Payer: MEDICARE

## 2019-11-21 DIAGNOSIS — Z96.651 AFTERCARE FOLLOWING RIGHT KNEE JOINT REPLACEMENT SURGERY: Primary | ICD-10-CM

## 2019-11-21 DIAGNOSIS — Z47.1 AFTERCARE FOLLOWING RIGHT KNEE JOINT REPLACEMENT SURGERY: Primary | ICD-10-CM

## 2019-11-21 DIAGNOSIS — Z96.651 STATUS POST TOTAL KNEE REPLACEMENT, RIGHT: ICD-10-CM

## 2019-11-21 LAB
CREAT SERPL-MCNC: 1.22 MG/DL (ref 0.52–1.04)
GFR SERPL CREATININE-BSD FRML MDRD: 43 ML/MIN/{1.73_M2}
HGB BLD-MCNC: 13.6 G/DL (ref 11.7–15.7)
POTASSIUM SERPL-SCNC: 4.2 MMOL/L (ref 3.4–5.3)

## 2019-11-21 PROCEDURE — 25000125 ZZHC RX 250: Performed by: ORTHOPAEDIC SURGERY

## 2019-11-21 PROCEDURE — 97161 PT EVAL LOW COMPLEX 20 MIN: CPT | Mod: GP | Performed by: PHYSICAL THERAPIST

## 2019-11-21 PROCEDURE — 25000128 H RX IP 250 OP 636: Performed by: ORTHOPAEDIC SURGERY

## 2019-11-21 PROCEDURE — C1776 JOINT DEVICE (IMPLANTABLE): HCPCS | Performed by: ORTHOPAEDIC SURGERY

## 2019-11-21 PROCEDURE — 84132 ASSAY OF SERUM POTASSIUM: CPT | Performed by: PHYSICIAN ASSISTANT

## 2019-11-21 PROCEDURE — 25000125 ZZHC RX 250: Performed by: PHYSICIAN ASSISTANT

## 2019-11-21 PROCEDURE — 25000125 ZZHC RX 250: Performed by: NURSE ANESTHETIST, CERTIFIED REGISTERED

## 2019-11-21 PROCEDURE — 40000170 ZZH STATISTIC PRE-PROCEDURE ASSESSMENT II: Performed by: ORTHOPAEDIC SURGERY

## 2019-11-21 PROCEDURE — 37000008 ZZH ANESTHESIA TECHNICAL FEE, 1ST 30 MIN: Performed by: ORTHOPAEDIC SURGERY

## 2019-11-21 PROCEDURE — 82565 ASSAY OF CREATININE: CPT | Performed by: PHYSICIAN ASSISTANT

## 2019-11-21 PROCEDURE — 25800030 ZZH RX IP 258 OP 636: Performed by: ANESTHESIOLOGY

## 2019-11-21 PROCEDURE — 25800030 ZZH RX IP 258 OP 636: Performed by: ORTHOPAEDIC SURGERY

## 2019-11-21 PROCEDURE — 71000012 ZZH RECOVERY PHASE 1 LEVEL 1 FIRST HR: Performed by: ORTHOPAEDIC SURGERY

## 2019-11-21 PROCEDURE — 25000132 ZZH RX MED GY IP 250 OP 250 PS 637: Mod: GY | Performed by: ORTHOPAEDIC SURGERY

## 2019-11-21 PROCEDURE — 97110 THERAPEUTIC EXERCISES: CPT | Mod: GP | Performed by: PHYSICAL THERAPIST

## 2019-11-21 PROCEDURE — 25000132 ZZH RX MED GY IP 250 OP 250 PS 637: Mod: GY | Performed by: PHYSICIAN ASSISTANT

## 2019-11-21 PROCEDURE — 25800025 ZZH RX 258: Performed by: ORTHOPAEDIC SURGERY

## 2019-11-21 PROCEDURE — 27210794 ZZH OR GENERAL SUPPLY STERILE: Performed by: ORTHOPAEDIC SURGERY

## 2019-11-21 PROCEDURE — 36000093 ZZH SURGERY LEVEL 4 1ST 30 MIN: Performed by: ORTHOPAEDIC SURGERY

## 2019-11-21 PROCEDURE — 97116 GAIT TRAINING THERAPY: CPT | Mod: GP | Performed by: PHYSICAL THERAPIST

## 2019-11-21 PROCEDURE — 85018 HEMOGLOBIN: CPT | Performed by: PHYSICIAN ASSISTANT

## 2019-11-21 PROCEDURE — 97530 THERAPEUTIC ACTIVITIES: CPT | Mod: GP | Performed by: PHYSICAL THERAPIST

## 2019-11-21 PROCEDURE — 25000128 H RX IP 250 OP 636: Performed by: NURSE ANESTHETIST, CERTIFIED REGISTERED

## 2019-11-21 PROCEDURE — 36000063 ZZH SURGERY LEVEL 4 EA 15 ADDTL MIN: Performed by: ORTHOPAEDIC SURGERY

## 2019-11-21 PROCEDURE — 25800030 ZZH RX IP 258 OP 636: Performed by: PHYSICIAN ASSISTANT

## 2019-11-21 PROCEDURE — 25000128 H RX IP 250 OP 636: Performed by: ANESTHESIOLOGY

## 2019-11-21 PROCEDURE — 40000985 XR KNEE PORT RT 1/2 VW: Mod: RT

## 2019-11-21 PROCEDURE — 25000128 H RX IP 250 OP 636: Performed by: PHYSICIAN ASSISTANT

## 2019-11-21 PROCEDURE — 37000009 ZZH ANESTHESIA TECHNICAL FEE, EACH ADDTL 15 MIN: Performed by: ORTHOPAEDIC SURGERY

## 2019-11-21 PROCEDURE — 27810169 ZZH OR IMPLANT GENERAL: Performed by: ORTHOPAEDIC SURGERY

## 2019-11-21 PROCEDURE — 25800030 ZZH RX IP 258 OP 636: Performed by: NURSE ANESTHETIST, CERTIFIED REGISTERED

## 2019-11-21 PROCEDURE — 36415 COLL VENOUS BLD VENIPUNCTURE: CPT | Performed by: PHYSICIAN ASSISTANT

## 2019-11-21 DEVICE — IMP INSERT BASEPLATE TIBIAL STRK TRI 2 5521-B-200: Type: IMPLANTABLE DEVICE | Site: KNEE | Status: FUNCTIONAL

## 2019-11-21 DEVICE — IMP BONE CEMENT SIMPLEX W/GENTAMICIN 6195-1-001: Type: IMPLANTABLE DEVICE | Site: KNEE | Status: FUNCTIONAL

## 2019-11-21 DEVICE — BONE CEMENT RADIOPAQUE SIMPLEX HV FULL DOSE 6194-1-001: Type: IMPLANTABLE DEVICE | Site: KNEE | Status: FUNCTIONAL

## 2019-11-21 DEVICE — IMP COMP PATELLA HOWM ASYM TRI 32X10MM 5551-L-320: Type: IMPLANTABLE DEVICE | Site: KNEE | Status: FUNCTIONAL

## 2019-11-21 DEVICE — IMP COMP FEM STRK TRIATHLN CR RT 3 5510-F-302: Type: IMPLANTABLE DEVICE | Site: KNEE | Status: FUNCTIONAL

## 2019-11-21 DEVICE — IMPLANTABLE DEVICE: Type: IMPLANTABLE DEVICE | Site: KNEE | Status: FUNCTIONAL

## 2019-11-21 RX ORDER — ACETAMINOPHEN 500 MG
1000 TABLET ORAL ONCE
Status: COMPLETED | OUTPATIENT
Start: 2019-11-21 | End: 2019-11-21

## 2019-11-21 RX ORDER — HYDROMORPHONE HYDROCHLORIDE 1 MG/ML
.3-.5 INJECTION, SOLUTION INTRAMUSCULAR; INTRAVENOUS; SUBCUTANEOUS
Status: DISCONTINUED | OUTPATIENT
Start: 2019-11-21 | End: 2019-11-22 | Stop reason: HOSPADM

## 2019-11-21 RX ORDER — CEFAZOLIN SODIUM 2 G/100ML
2 INJECTION, SOLUTION INTRAVENOUS
Status: COMPLETED | OUTPATIENT
Start: 2019-11-21 | End: 2019-11-21

## 2019-11-21 RX ORDER — KETOROLAC TROMETHAMINE 15 MG/ML
15 INJECTION, SOLUTION INTRAMUSCULAR; INTRAVENOUS EVERY 6 HOURS
Status: COMPLETED | OUTPATIENT
Start: 2019-11-21 | End: 2019-11-22

## 2019-11-21 RX ORDER — HYDROMORPHONE HYDROCHLORIDE 1 MG/ML
.3-.5 INJECTION, SOLUTION INTRAMUSCULAR; INTRAVENOUS; SUBCUTANEOUS EVERY 5 MIN PRN
Status: DISCONTINUED | OUTPATIENT
Start: 2019-11-21 | End: 2019-11-21 | Stop reason: HOSPADM

## 2019-11-21 RX ORDER — EPHEDRINE SULFATE 50 MG/ML
INJECTION, SOLUTION INTRAMUSCULAR; INTRAVENOUS; SUBCUTANEOUS PRN
Status: DISCONTINUED | OUTPATIENT
Start: 2019-11-21 | End: 2019-11-21

## 2019-11-21 RX ORDER — PROPOFOL 10 MG/ML
INJECTION, EMULSION INTRAVENOUS CONTINUOUS PRN
Status: DISCONTINUED | OUTPATIENT
Start: 2019-11-21 | End: 2019-11-21

## 2019-11-21 RX ORDER — VANCOMYCIN HYDROCHLORIDE 1 G/20ML
INJECTION, POWDER, LYOPHILIZED, FOR SOLUTION INTRAVENOUS PRN
Status: DISCONTINUED | OUTPATIENT
Start: 2019-11-21 | End: 2019-11-21 | Stop reason: HOSPADM

## 2019-11-21 RX ORDER — HYDROXYZINE HYDROCHLORIDE 10 MG/1
10 TABLET, FILM COATED ORAL EVERY 6 HOURS PRN
Status: DISCONTINUED | OUTPATIENT
Start: 2019-11-21 | End: 2019-11-22 | Stop reason: HOSPADM

## 2019-11-21 RX ORDER — NALOXONE HYDROCHLORIDE 0.4 MG/ML
.1-.4 INJECTION, SOLUTION INTRAMUSCULAR; INTRAVENOUS; SUBCUTANEOUS
Status: DISCONTINUED | OUTPATIENT
Start: 2019-11-21 | End: 2019-11-22 | Stop reason: HOSPADM

## 2019-11-21 RX ORDER — PROCHLORPERAZINE MALEATE 5 MG
5 TABLET ORAL EVERY 6 HOURS PRN
Status: DISCONTINUED | OUTPATIENT
Start: 2019-11-21 | End: 2019-11-22 | Stop reason: HOSPADM

## 2019-11-21 RX ORDER — CEFAZOLIN SODIUM 2 G/100ML
2 INJECTION, SOLUTION INTRAVENOUS EVERY 8 HOURS
Status: COMPLETED | OUTPATIENT
Start: 2019-11-21 | End: 2019-11-22

## 2019-11-21 RX ORDER — SODIUM CHLORIDE, SODIUM LACTATE, POTASSIUM CHLORIDE, CALCIUM CHLORIDE 600; 310; 30; 20 MG/100ML; MG/100ML; MG/100ML; MG/100ML
INJECTION, SOLUTION INTRAVENOUS CONTINUOUS
Status: DISCONTINUED | OUTPATIENT
Start: 2019-11-21 | End: 2019-11-21 | Stop reason: HOSPADM

## 2019-11-21 RX ORDER — CEFAZOLIN SODIUM 1 G/3ML
1 INJECTION, POWDER, FOR SOLUTION INTRAMUSCULAR; INTRAVENOUS SEE ADMIN INSTRUCTIONS
Status: DISCONTINUED | OUTPATIENT
Start: 2019-11-21 | End: 2019-11-21 | Stop reason: HOSPADM

## 2019-11-21 RX ORDER — OXYCODONE HYDROCHLORIDE 5 MG/1
5 TABLET ORAL
Status: DISCONTINUED | OUTPATIENT
Start: 2019-11-21 | End: 2019-11-22 | Stop reason: HOSPADM

## 2019-11-21 RX ORDER — CELECOXIB 200 MG/1
400 CAPSULE ORAL ONCE
Status: COMPLETED | OUTPATIENT
Start: 2019-11-21 | End: 2019-11-21

## 2019-11-21 RX ORDER — LIDOCAINE HYDROCHLORIDE 20 MG/ML
INJECTION, SOLUTION INFILTRATION; PERINEURAL PRN
Status: DISCONTINUED | OUTPATIENT
Start: 2019-11-21 | End: 2019-11-21

## 2019-11-21 RX ORDER — DEXTROSE MONOHYDRATE, SODIUM CHLORIDE, AND POTASSIUM CHLORIDE 50; 1.49; 4.5 G/1000ML; G/1000ML; G/1000ML
INJECTION, SOLUTION INTRAVENOUS CONTINUOUS
Status: DISCONTINUED | OUTPATIENT
Start: 2019-11-21 | End: 2019-11-22 | Stop reason: HOSPADM

## 2019-11-21 RX ORDER — BUPIVACAINE HYDROCHLORIDE 7.5 MG/ML
INJECTION, SOLUTION INTRASPINAL PRN
Status: DISCONTINUED | OUTPATIENT
Start: 2019-11-21 | End: 2019-11-21

## 2019-11-21 RX ORDER — ACETAMINOPHEN 325 MG/1
975 TABLET ORAL EVERY 8 HOURS SCHEDULED
Status: DISCONTINUED | OUTPATIENT
Start: 2019-11-21 | End: 2019-11-22 | Stop reason: HOSPADM

## 2019-11-21 RX ORDER — DEXAMETHASONE SODIUM PHOSPHATE 4 MG/ML
INJECTION, SOLUTION INTRA-ARTICULAR; INTRALESIONAL; INTRAMUSCULAR; INTRAVENOUS; SOFT TISSUE PRN
Status: DISCONTINUED | OUTPATIENT
Start: 2019-11-21 | End: 2019-11-21

## 2019-11-21 RX ORDER — ONDANSETRON 2 MG/ML
4 INJECTION INTRAMUSCULAR; INTRAVENOUS EVERY 30 MIN PRN
Status: DISCONTINUED | OUTPATIENT
Start: 2019-11-21 | End: 2019-11-21 | Stop reason: HOSPADM

## 2019-11-21 RX ORDER — LIDOCAINE 40 MG/G
CREAM TOPICAL
Status: DISCONTINUED | OUTPATIENT
Start: 2019-11-21 | End: 2019-11-22 | Stop reason: HOSPADM

## 2019-11-21 RX ORDER — FENTANYL CITRATE 50 UG/ML
50-100 INJECTION, SOLUTION INTRAMUSCULAR; INTRAVENOUS
Status: DISCONTINUED | OUTPATIENT
Start: 2019-11-21 | End: 2019-11-21 | Stop reason: HOSPADM

## 2019-11-21 RX ORDER — FENTANYL CITRATE 50 UG/ML
25-50 INJECTION, SOLUTION INTRAMUSCULAR; INTRAVENOUS
Status: DISCONTINUED | OUTPATIENT
Start: 2019-11-21 | End: 2019-11-21 | Stop reason: HOSPADM

## 2019-11-21 RX ORDER — MAGNESIUM HYDROXIDE 1200 MG/15ML
LIQUID ORAL PRN
Status: DISCONTINUED | OUTPATIENT
Start: 2019-11-21 | End: 2019-11-21 | Stop reason: HOSPADM

## 2019-11-21 RX ORDER — ONDANSETRON 2 MG/ML
INJECTION INTRAMUSCULAR; INTRAVENOUS PRN
Status: DISCONTINUED | OUTPATIENT
Start: 2019-11-21 | End: 2019-11-21

## 2019-11-21 RX ORDER — ONDANSETRON 4 MG/1
4 TABLET, ORALLY DISINTEGRATING ORAL EVERY 30 MIN PRN
Status: DISCONTINUED | OUTPATIENT
Start: 2019-11-21 | End: 2019-11-21 | Stop reason: HOSPADM

## 2019-11-21 RX ORDER — NALOXONE HYDROCHLORIDE 0.4 MG/ML
.1-.4 INJECTION, SOLUTION INTRAMUSCULAR; INTRAVENOUS; SUBCUTANEOUS
Status: DISCONTINUED | OUTPATIENT
Start: 2019-11-21 | End: 2019-11-21

## 2019-11-21 RX ORDER — ONDANSETRON 4 MG/1
4 TABLET, ORALLY DISINTEGRATING ORAL EVERY 6 HOURS PRN
Status: DISCONTINUED | OUTPATIENT
Start: 2019-11-21 | End: 2019-11-22 | Stop reason: HOSPADM

## 2019-11-21 RX ORDER — PREGABALIN 150 MG/1
150 CAPSULE ORAL ONCE
Status: COMPLETED | OUTPATIENT
Start: 2019-11-21 | End: 2019-11-21

## 2019-11-21 RX ORDER — CALCIUM CARBONATE 500 MG/1
1000 TABLET, CHEWABLE ORAL 4 TIMES DAILY PRN
Status: DISCONTINUED | OUTPATIENT
Start: 2019-11-21 | End: 2019-11-22 | Stop reason: HOSPADM

## 2019-11-21 RX ORDER — KETOROLAC TROMETHAMINE 30 MG/ML
INJECTION, SOLUTION INTRAMUSCULAR; INTRAVENOUS PRN
Status: DISCONTINUED | OUTPATIENT
Start: 2019-11-21 | End: 2019-11-21 | Stop reason: HOSPADM

## 2019-11-21 RX ORDER — ONDANSETRON 2 MG/ML
4 INJECTION INTRAMUSCULAR; INTRAVENOUS EVERY 6 HOURS PRN
Status: DISCONTINUED | OUTPATIENT
Start: 2019-11-21 | End: 2019-11-22 | Stop reason: HOSPADM

## 2019-11-21 RX ADMIN — MIDAZOLAM HYDROCHLORIDE 1 MG: 1 INJECTION, SOLUTION INTRAMUSCULAR; INTRAVENOUS at 08:48

## 2019-11-21 RX ADMIN — PHENYLEPHRINE HYDROCHLORIDE 50 MCG: 10 INJECTION INTRAVENOUS at 10:28

## 2019-11-21 RX ADMIN — PHENYLEPHRINE HYDROCHLORIDE 100 MCG: 10 INJECTION INTRAVENOUS at 09:57

## 2019-11-21 RX ADMIN — OXYCODONE HYDROCHLORIDE 5 MG: 5 TABLET ORAL at 16:11

## 2019-11-21 RX ADMIN — SODIUM CHLORIDE, POTASSIUM CHLORIDE, SODIUM LACTATE AND CALCIUM CHLORIDE: 600; 310; 30; 20 INJECTION, SOLUTION INTRAVENOUS at 10:41

## 2019-11-21 RX ADMIN — PHENYLEPHRINE HYDROCHLORIDE 0.2 MCG/KG/MIN: 10 INJECTION INTRAVENOUS at 10:05

## 2019-11-21 RX ADMIN — OXYCODONE HYDROCHLORIDE 5 MG: 5 TABLET ORAL at 19:33

## 2019-11-21 RX ADMIN — FENTANYL CITRATE 50 MCG: 0.05 INJECTION, SOLUTION INTRAMUSCULAR; INTRAVENOUS at 08:49

## 2019-11-21 RX ADMIN — SODIUM CHLORIDE, POTASSIUM CHLORIDE, SODIUM LACTATE AND CALCIUM CHLORIDE: 600; 310; 30; 20 INJECTION, SOLUTION INTRAVENOUS at 08:36

## 2019-11-21 RX ADMIN — PROPOFOL 75 MCG/KG/MIN: 10 INJECTION, EMULSION INTRAVENOUS at 09:49

## 2019-11-21 RX ADMIN — KETOROLAC TROMETHAMINE 15 MG: 15 INJECTION, SOLUTION INTRAMUSCULAR; INTRAVENOUS at 20:25

## 2019-11-21 RX ADMIN — BUPIVACAINE HYDROCHLORIDE IN DEXTROSE 12 MG: 7.5 INJECTION, SOLUTION SUBARACHNOID at 09:47

## 2019-11-21 RX ADMIN — CEFAZOLIN SODIUM 1 G: 2 INJECTION, SOLUTION INTRAVENOUS at 11:50

## 2019-11-21 RX ADMIN — PREGABALIN 150 MG: 150 CAPSULE ORAL at 07:52

## 2019-11-21 RX ADMIN — PHENYLEPHRINE HYDROCHLORIDE 100 MCG: 10 INJECTION INTRAVENOUS at 10:01

## 2019-11-21 RX ADMIN — TRANEXAMIC ACID 1 G: 1 INJECTION, SOLUTION INTRAVENOUS at 10:00

## 2019-11-21 RX ADMIN — ACETAMINOPHEN 975 MG: 325 TABLET, FILM COATED ORAL at 17:50

## 2019-11-21 RX ADMIN — CEFAZOLIN SODIUM 2 G: 2 INJECTION, SOLUTION INTRAVENOUS at 09:50

## 2019-11-21 RX ADMIN — CELECOXIB 400 MG: 200 CAPSULE ORAL at 07:52

## 2019-11-21 RX ADMIN — POTASSIUM CHLORIDE, DEXTROSE MONOHYDRATE AND SODIUM CHLORIDE: 150; 5; 450 INJECTION, SOLUTION INTRAVENOUS at 23:39

## 2019-11-21 RX ADMIN — Medication 5 MG: at 10:09

## 2019-11-21 RX ADMIN — CEFAZOLIN SODIUM 2 G: 2 INJECTION, SOLUTION INTRAVENOUS at 20:22

## 2019-11-21 RX ADMIN — ACETAMINOPHEN 975 MG: 325 TABLET, FILM COATED ORAL at 23:39

## 2019-11-21 RX ADMIN — PHENYLEPHRINE HYDROCHLORIDE 50 MCG: 10 INJECTION INTRAVENOUS at 11:32

## 2019-11-21 RX ADMIN — TRANEXAMIC ACID 1 G: 1 INJECTION, SOLUTION INTRAVENOUS at 11:55

## 2019-11-21 RX ADMIN — PHENYLEPHRINE HYDROCHLORIDE 100 MCG: 10 INJECTION INTRAVENOUS at 10:08

## 2019-11-21 RX ADMIN — ACETAMINOPHEN 1000 MG: 500 TABLET, FILM COATED ORAL at 07:52

## 2019-11-21 RX ADMIN — LIDOCAINE HYDROCHLORIDE 40 MG: 20 INJECTION, SOLUTION INFILTRATION; PERINEURAL at 09:49

## 2019-11-21 RX ADMIN — HYDROXYZINE HYDROCHLORIDE 10 MG: 10 TABLET ORAL at 17:50

## 2019-11-21 RX ADMIN — ONDANSETRON 4 MG: 2 INJECTION INTRAMUSCULAR; INTRAVENOUS at 09:55

## 2019-11-21 RX ADMIN — DEXAMETHASONE SODIUM PHOSPHATE 4 MG: 4 INJECTION, SOLUTION INTRA-ARTICULAR; INTRALESIONAL; INTRAMUSCULAR; INTRAVENOUS; SOFT TISSUE at 09:55

## 2019-11-21 RX ADMIN — POTASSIUM CHLORIDE, DEXTROSE MONOHYDRATE AND SODIUM CHLORIDE: 150; 5; 450 INJECTION, SOLUTION INTRAVENOUS at 15:00

## 2019-11-21 RX ADMIN — PHENYLEPHRINE HYDROCHLORIDE 50 MCG: 10 INJECTION INTRAVENOUS at 11:59

## 2019-11-21 RX ADMIN — OXYCODONE HYDROCHLORIDE 5 MG: 5 TABLET ORAL at 23:39

## 2019-11-21 ASSESSMENT — LIFESTYLE VARIABLES: TOBACCO_USE: 1

## 2019-11-21 ASSESSMENT — MIFFLIN-ST. JEOR: SCORE: 1318.25

## 2019-11-21 NOTE — PROGRESS NOTES
Admission medication history interview status for the 11/21/2019  admission is complete. See EPIC admission navigator for prior to admission medications     Medication history source reliability:Moderate    Medication history interview source(s):Patient    Medication history resources (including written lists, pill bottles, clinic record):MAILED IN LIST    Primary pharmacy.[facility]    Additional medication history information not noted on PTA med list :None    Time spent in this activity: 50 MINUTES    Prior to Admission medications    Medication Sig Last Dose Taking? Auth Provider   acetaminophen (TYLENOL) 500 MG tablet Take 1,000 mg by mouth 2 times daily (500MG X 2 = 1,000MG) 11/20/2019 at PM Yes Reported, Patient   amLODIPine (NORVASC) 5 MG tablet Take 5 mg by mouth daily as needed (if blood pressure is above 125.) MORE THAN 2 WEEKS at PRN Yes Reported, Patient   aspirin (ASA) 81 MG chewable tablet Take 81 mg by mouth At Bedtime MORE THAN A WEEK at PM Yes Unknown, Entered By History   Calcium Carb-Cholecalciferol (CALCIUM + D3 PO) Take 1 tablet by mouth daily MORE THAN A WEEK at 1200 Yes Reported, Patient   Carboxymethylcellulose Sodium (THERATEARS OP) Place 1-2 drops into both eyes daily as needed 11/21/2019 at 0500 Yes Reported, Patient   famotidine (PEPCID) 20 MG tablet Take 20 mg by mouth daily as needed 11/20/2019 at AM Yes Unknown, Entered By History   fish oil-omega-3 fatty acids 1000 MG capsule Take 1 g by mouth daily  MORE THAN A WEEK at 1200 Yes Unknown, Entered By History   Glucosamine-Chondroitin (MOVE FREE PO) Take 1 tablet by mouth every morning MORE THAN A WEEK at AM Yes Reported, Patient   hydrochlorothiazide (HYDRODIURIL) 12.5 MG tablet Take 12.5 mg by mouth daily as needed (if retaining fluid.)  MORE THAN 2 WEEKS at PRN Yes Unknown, Entered By History   magnesium 250 MG tablet Take 1 tablet by mouth every evening  MORE THAN A WEEK at PM Yes Unknown, Entered By History   Melatonin 10 MG TABS  tablet Take 10 mg by mouth nightly as needed for sleep 11/19/2019 at PM Yes Reported, Patient   multivitamin w/minerals (MULTI-VITAMIN) tablet Take 1 tablet by mouth every morning 11/20/2019 at AM Yes Reported, Patient   nitroFURantoin macrocrystal-monohydrate (MACROBID) 100 MG capsule Take 100 mg by mouth daily (Preventative- UTI) 11/20/2019 at AM Yes Reported, Patient   Nutritional Supplements (NUTRITIONAL SUPPLEMENT PO) Take 2 capsules by mouth every morning (Uricalm) 11/20/2019 at AM Yes Reported, Patient   Probiotic Product (PROBIOTIC-10 PO) Take 1 capsule by mouth every morning MORE THAN A WEEK at AM Yes Reported, Patient   Turmeric 500 MG CAPS Take 500 mg by mouth daily (with lunch) (at 12:00)  MORE THAN A WEEK at 1200 Yes Reported, Patient   vitamin D3 (CHOLECALCIFEROL) 1000 units (25 mcg) tablet Take 1,000 Units by mouth daily MORE THAN A WEEK at AM Yes Unknown, Entered By History

## 2019-11-21 NOTE — PLAN OF CARE
Patient plan: To go home with OP PT per [facility] plan  Current status: Orders received. Chart reviewed. Evaluation completed and treatment initiated. Pt is a 73 year old female POD#0 right TKA. Pt lives alone in a house with a few steps and at baseline was independent without a device.    Pt awake but very lethargic. Pt had been on floor for one hour. Pt agreeable to do eval. Pt Bp 109/53 at rest in supine asymptomatic. Pt sat EOB with Kayy pt having some dizziness /77. Pt stood with Kayy and FWW. Pt with dizziness but did not get worse from sitting. Pt ambulated with FWW and Kayy 15ft. Pt still complained of foggy feeling.Pt returned to supine after steps and required min A for right LE. BP back in supine 121/77.   Anticipated status at discharge: Bed mobility with Arcadio, transfers Arcadio using walker, gait with walker Arcadio, and stairs with supervision to Kayy using rail.

## 2019-11-21 NOTE — CONSULTS
Susanne Escoto is a 74 yo female with PMH of HTN (on prn medications) and recurrent UTI is s/p RTKA. Patient is medically stable with no acute issues. She plans to discharge home tomorrow.    - Hold prn BP medications  - Hospitalist service will sign off      Sarah Daniel PA-C  Hospitalist Service  [phone]

## 2019-11-21 NOTE — OR NURSING
Post nerve block placement completed.  See anesthesia record for procedure documentation. Pt awaiting surgery. Family with patient.

## 2019-11-21 NOTE — BRIEF OP NOTE
[facility]    Brief Operative Note    Pre-operative diagnosis: Right knee OA  Post-operative diagnosis same  Procedure: Procedure(s): RIGHT TOTAL KNEE ARTHROPLASTY  Surgeon(s) and Role:     * Gopal Snowden MD - Primary     *    * Paola Pryor PA-C - Assisting     Anesthesia: General   Estimated blood loss: 100 ml  Drains:  None  Specimens: None  Findings:  Advanced OA  Complications: None    Plan: DC home POD1 w/family assist.  DVT prophylaxis w/ASA 325mg QD x6wks.    Implants:   Implant Name Type Inv. Item Serial No.  Lot No. LRB No. Used   BONE CEMENT RADIOPAQUE SIMPLEX HV FULL DOSE 6194-1-001 Cement, Bone BONE CEMENT RADIOPAQUE SIMPLEX HV FULL DOSE 6194-1-001  [employer] [id number] Right 1   BONE CEMENT SIMPLEX W/GENTAMICIN 6195-1-001 Cement, Bone BONE CEMENT SIMPLEX W/GENTAMICIN 6195-1-001  [employer] [id number] Right 1   IMP COMP PATELLA HOWM ASYM TRI [id number] 5551-L-320 Total Joint Component/Insert IMP COMP PATELLA HOWM ASYM TRI [id number] 5551-L-320  [employer] [id number] Right 1   IMP INSERT BASEPLATE TIBIAL STRK TRI 2 5521-B-200 Total Joint Component/Insert IMP INSERT BASEPLATE TIBIAL STRK TRI 2 5521-B-200  [employer] [id number] Right 1   IMP COMP FEM STRK TRIATHLN CR RT 3 5510-F-302 Total Joint Component/Insert IMP COMP FEM STRK TRIATHLN CR RT 3 5510-F-302  [employer] [id number] Right 1   TIBIAL BEARING INSERT-CS    DANIELLE [id number] Right 1

## 2019-11-21 NOTE — ANESTHESIA CARE TRANSFER NOTE
Patient: Susanne Escoto    Procedure(s):  RIGHT TOTAL KNEE ARTHROPLASTY    Diagnosis: RIGHT KNEE DJD  Diagnosis Additional Information: No value filed.    Anesthesia Type:   Peripheral Nerve Block, Spinal     Note:  Airway :Face Mask  Patient transferred to:PACU  Handoff Report: Identifed the Patient, Identified the Reponsible Provider, Reviewed the pertinent medical history, Discussed the surgical course, Reviewed Intra-OP anesthesia mangement and issues during anesthesia, Set expectations for post-procedure period and Allowed opportunity for questions and acknowledgement of understanding      Vitals: (Last set prior to Anesthesia Care Transfer)    CRNA VITALS  11/21/2019 1208 - 11/21/2019 1244      11/21/2019             Resp Rate (set):  10                Electronically Signed By: JOSE RAFAEL Carrasco CRNA  November 21, 2019  12:44 PM

## 2019-11-21 NOTE — ANESTHESIA POSTPROCEDURE EVALUATION
Patient: Susanne Escoto    Procedure(s):  RIGHT TOTAL KNEE ARTHROPLASTY    Diagnosis:RIGHT KNEE DJD  Diagnosis Additional Information: No value filed.    Anesthesia Type:  Peripheral Nerve Block, Spinal    Note:  Anesthesia Post Evaluation    Patient location during evaluation: PACU  Patient participation: Able to fully participate in evaluation  Level of consciousness: awake  Pain management: adequate  Airway patency: patent  Cardiovascular status: acceptable  Respiratory status: acceptable  Hydration status: acceptable  PONV: none     Anesthetic complications: None          Last vitals:  Vitals:    11/21/19 1510 11/21/19 1615 11/21/19 1715   BP: 109/53 116/70 126/75   Pulse: 63 67 70   Resp: 16 15 14   Temp:      SpO2: 97% 96% 97%         Electronically Signed By: Shiv Siddiqui MD  November 21, 2019  5:43 PM

## 2019-11-21 NOTE — PROGRESS NOTES
11/21/19 1517   Quick Adds   Type of Visit Initial PT Evaluation   Living Environment   Lives With alone   Living Arrangements house   Home Accessibility stairs to enter home;stairs within home   Number of Stairs, Main Entrance 2   Stair Railings, Main Entrance railing on left side (ascending)   Number of Stairs, Within Home, Primary 10   Stair Railings, Within Home, Primary railing on right side (ascending)   Transportation Anticipated car, drives self;family or friend will provide   Self-Care   Usual Activity Tolerance good   Current Activity Tolerance fair   Regular Exercise No   Equipment Currently Used at Home none  (pt has a cane but rarely uses)   Activity/Exercise/Self-Care Comment Pt is independent with all ADLs, IADLs   Functional Level Prior   Ambulation 0-->independent   Transferring 0-->independent   Toileting 0-->independent   Bathing 0-->independent   Communication 0-->understands/communicates without difficulty   Swallowing 0-->swallows foods/liquids without difficulty   Cognition 0 - no cognition issues reported   Fall history within last six months no   General Information   Onset of Illness/Injury or Date of Surgery - Date 11/21/19   Referring Physician Dr. Lo   Patient/Family Goals Statement To go home tomorrow. OP Monday   Pertinent History of Current Problem (include personal factors and/or comorbidities that impact the POC) Pt is a 73 year old female POD#0 TKA right   Precautions/Limitations no known precautions/limitations;fall precautions;oxygen therapy device and L/min   Weight-Bearing Status - LLE full weight-bearing   Weight-Bearing Status - RLE weight-bearing as tolerated   Cognitive Status Examination   Orientation orientation to person, place and time   Level of Consciousness lethargic/somnolent   Follows Commands and Answers Questions 100% of the time   Personal Safety and Judgment intact   Memory intact   Pain Assessment   Patient Currently in Pain Yes, see Vital Sign  "flowsheet   Integumentary/Edema   Integumentary/Edema Comments right leg wrapped   Range of Motion (ROM)   ROM Comment Decreased right knee consistent with surgery. other extremities WFL   Strength   Strength Comments WFL except for right knee consistent with surgery   Bed Mobility   Bed Mobility Comments supine to and from sit: Kayy   Transfer Skills   Transfer Comments sit to stand: Kayy   Gait   Gait Comments Pt ambulated with FWW a few steps with Kayy of one and assst for equipment   Balance   Balance Comments requires FWW   Sensory Examination   Sensory Perception no deficits were identified   Coordination   Coordination no deficits were identified   Modality Interventions   Planned Modality Interventions Cryotherapy   General Therapy Interventions   Planned Therapy Interventions bed mobility training;gait training;ROM;strengthening;transfer training;home program guidelines;progressive activity/exercise   Clinical Impression   Criteria for Skilled Therapeutic Intervention yes, treatment indicated   PT Diagnosis Difficulty with gait   Influenced by the following impairments Pain, decreased right knee ROM and Strength, dizziness   Functional limitations due to impairments increased assist with functional mobilty compared to baseline   Clinical Presentation Stable/Uncomplicated   Clinical Presentation Rationale clinical judgement   Clinical Decision Making (Complexity) Low complexity   Therapy Frequency 2x/day   Predicted Duration of Therapy Intervention (days/wks) 2 days   Anticipated Equipment Needs at Discharge front wheeled walker   Anticipated Discharge Disposition Home with Outpatient Therapy   Risk & Benefits of therapy have been explained Yes   Patient, Family & other staff in agreement with plan of care Yes   [facility] AM-PAC TM \"6 Clicks\"   2016, Trustees of [facility], under license to [employer].  All rights reserved.   6 Clicks Short Forms Basic Mobility Inpatient Short Form   [facility] [place] " "[facility]  \"6 Clicks\" V.2 Basic Mobility Inpatient Short Form   1. Turning from your back to your side while in a flat bed without using bedrails? 3 - A Little   2. Moving from lying on your back to sitting on the side of a flat bed without using bedrails? 3 - A Little   3. Moving to and from a bed to a chair (including a wheelchair)? 3 - A Little   4. Standing up from a chair using your arms (e.g., wheelchair, or bedside chair)? 3 - A Little   5. To walk in hospital room? 3 - A Little   6. Climbing 3-5 steps with a railing? 3 - A Little   Basic Mobility Raw Score (Score out of 24.Lower scores equate to lower levels of function) 18   Total Evaluation Time   Total Evaluation Time (Minutes) 10     "

## 2019-11-21 NOTE — ANESTHESIA PROCEDURE NOTES
Peripheral nerve/Neuraxial procedure note : intrathecal  Pre-Procedure  Performed by Shiv Siddiqui MD  Location: OR      Pre-Anesthestic Checklist: patient identified, IV checked, site marked, risks and benefits discussed, informed consent, monitors and equipment checked, pre-op evaluation and post-op pain management    Timeout  Correct Patient: Yes   Correct Procedure: Yes   Correct Site: Yes   Correct Laterality: Yes   Correct Position: Yes   Site Marked: Yes   .   Procedure Documentation    .    Procedure: intrathecal, .   Patient Position:sitting Insertion Site:L3-4  (midline approach)     Patient Prep/Sterile Barriers; povidone-iodine 7.5% surgical scrub.  .  Needle:  Spinal Needle (gauge): 25  Spinal/LP Needle Length (inches): 3.5 # of attempts: 2 and  # of redirects:  2 Introducer used .        Assessment/Narrative  Paresthesias: No.  .  .  clear CSF fluid removed . Comments:  Free flowing CSF pre and post injection.  No abnormal pain or paresthesia throughout.  Patient tolerated well.

## 2019-11-21 NOTE — PROGRESS NOTES
Susanne Escoto MRN# [id number]   Age: 73 year old YOB: 1945     Patient vital signs are at baseline:yes  Patient able to ambulate as they were prior to admission or with assist devices provided by therapies during their stay:  No,  Reason:  No pt yet  Patient MUST void prior to discharge: no   Patient able to tolerate oral intake:no Pain has adequate pain control using Oral analgesics: yes     Pt arrived from PACU.  Rating pain #2.  Has duron.  Taking ice chips.  Knee drsg dry and intact.

## 2019-11-21 NOTE — ANESTHESIA PROCEDURE NOTES
Peripheral nerve/Neuraxial procedure note : Adductor canal  Pre-Procedure  Performed by Shiv Siddiqui MD  Location: pre-op      Pre-Anesthestic Checklist: patient identified, IV checked, site marked, risks and benefits discussed, informed consent, at physician/surgeon's request and post-op pain management    Timeout  Correct Patient: Yes   Correct Procedure: Yes   Correct Site: Yes   Correct Laterality: Yes   Correct Position: Yes   Site Marked: Yes   .   Procedure Documentation    .    Procedure: Adductor canal, right.   Local skin infiltrated with 3 mL of 1% lidocaine.    Ultrasound used to identify targeted nerve, plexus, or vascular marker and placed a needle adjacent to it., Ultrasound was used to visualize the spread of the anesthetic in close proximity to the above stated nerve. A permanent image is entered into the patient's record.  Patient Prep/Sterile Barriers; mask, sterile gloves, chlorhexidine gluconate and isopropyl alcohol.  .  Needle: insulated, short bevel   Needle Gauge: 21.  Needle Length (millimeters) 90  Insertion Method: Single Shot.        Assessment/Narrative  Paresthesias: No.  Injection made incrementally with aspirations every 5 mL..  The placement was negative for: blood aspirated and site bleeding.  Bolus given via needle..   Secured via.   Complications: none.

## 2019-11-21 NOTE — ANESTHESIA PREPROCEDURE EVALUATION
Anesthesia Pre-Procedure Evaluation    Patient: Susanne Escoto   MRN: [id number] : 1945          Preoperative Diagnosis: RIGHT KNEE DJD    Procedure(s):  RIGHT TOTAL KNEE ARTHROPLASTY (DANIELLE)^    Past Medical History:   Diagnosis Date     Chronic kidney disease      Chronic knee pain      Contact dermatitis      GERD (gastroesophageal reflux disease)      Hyperlipidemia      Hypertension      Hypokalemia      Hyponatremia      Lung cancer (H)      Menopause      Osteoarthritis      UTI (urinary tract infection)      Past Surgical History:   Procedure Laterality Date     AS ESOPHAGOSCOPY, DIAGNOSTIC       LUNG SURGERY       ORTHOPEDIC SURGERY      Right thumb cyst removal     THORACOTOMY         Anesthesia Evaluation     . Pt has had prior anesthetic. Type: General    No history of anesthetic complications          ROS/MED HX    ENT/Pulmonary: Comment: Lung cancer s/p right upper lobectomy    (+)tobacco use, Past use , . .    Neurologic:  - neg neurologic ROS     Cardiovascular:     (+) hypertension----. : . . . :. .       METS/Exercise Tolerance:  >4 METS   Hematologic:         Musculoskeletal:   (+) arthritis,  -       GI/Hepatic:     (+) GERD Asymptomatic on medication,       Renal/Genitourinary: Comment: History of hypokalemia    (+) chronic renal disease, type: CRI,       Endo:     (+) Obesity, .      Psychiatric:         Infectious Disease:         Malignancy:   (+) Malignancy History of Lung  Lung CA Remission status post Surgery.         Other:    (+) H/O Chronic Pain,                        Physical Exam  Normal systems: dental    Airway   Mallampati: II  TM distance: >3 FB  Neck ROM: full    Dental     Cardiovascular   Rhythm and rate: regular and normal      Pulmonary    breath sounds clear to auscultation            Lab Results   Component Value Date    WBC 13.6 (H) 2019    HGB 13.6 2019    HCT 39.0 2019     2019     2019    POTASSIUM 4.2  "11/21/2019    CHLORIDE 106 07/28/2019    CO2 26 07/28/2019    BUN 25 07/28/2019    CR 1.22 (H) 11/21/2019     (H) 07/28/2019    PJ 8.3 (L) 07/28/2019    PTT 28 07/10/2006    INR 0.89 07/10/2006       Preop Vitals  BP Readings from Last 3 Encounters:   11/21/19 103/63   07/28/19 103/62    Pulse Readings from Last 3 Encounters:   11/21/19 86   07/28/19 99      Resp Readings from Last 3 Encounters:   11/21/19 16   07/28/19 16    SpO2 Readings from Last 3 Encounters:   11/21/19 96%   07/28/19 95%      Temp Readings from Last 1 Encounters:   11/21/19 36.7  C (98.1  F) (Oral)    Ht Readings from Last 1 Encounters:   11/21/19 1.575 m (5' 2\")      Wt Readings from Last 1 Encounters:   11/21/19 87 kg (191 lb 12.8 oz)    Estimated body mass index is 35.08 kg/m  as calculated from the following:    Height as of this encounter: 1.575 m (5' 2\").    Weight as of this encounter: 87 kg (191 lb 12.8 oz).       Anesthesia Plan      History & Physical Review  History and physical reviewed and following examination; no interval change.    ASA Status:  3 .    NPO Status:  > 8 hours    Plan for Peripheral Nerve Block and Spinal          Postoperative Care  Postoperative pain management:  IV analgesics, Oral pain medications and Peripheral nerve block (Single Shot).      Consents  Anesthetic plan, risks, benefits and alternatives discussed with:  Patient.  Use of blood products discussed: Yes.   Use of blood products discussed with Patient.  Consented to blood products.  .                 Shiv Siddiqui MD  "

## 2019-11-22 ENCOUNTER — APPOINTMENT (OUTPATIENT)
Dept: PHYSICAL THERAPY | Facility: CLINIC | Age: 74
End: 2019-11-22
Attending: ORTHOPAEDIC SURGERY
Payer: MEDICARE

## 2019-11-22 ENCOUNTER — APPOINTMENT (OUTPATIENT)
Dept: OCCUPATIONAL THERAPY | Facility: CLINIC | Age: 74
End: 2019-11-22
Attending: ORTHOPAEDIC SURGERY
Payer: MEDICARE

## 2019-11-22 VITALS
TEMPERATURE: 97.7 F | SYSTOLIC BLOOD PRESSURE: 107 MMHG | RESPIRATION RATE: 16 BRPM | HEART RATE: 64 BPM | HEIGHT: 62 IN | WEIGHT: 191.8 LBS | DIASTOLIC BLOOD PRESSURE: 60 MMHG | BODY MASS INDEX: 35.3 KG/M2 | OXYGEN SATURATION: 96 %

## 2019-11-22 LAB
GLUCOSE SERPL-MCNC: 118 MG/DL (ref 70–99)
HGB BLD-MCNC: 11.5 G/DL (ref 11.7–15.7)

## 2019-11-22 PROCEDURE — 97110 THERAPEUTIC EXERCISES: CPT | Mod: GP

## 2019-11-22 PROCEDURE — 25000128 H RX IP 250 OP 636: Performed by: ORTHOPAEDIC SURGERY

## 2019-11-22 PROCEDURE — 97165 OT EVAL LOW COMPLEX 30 MIN: CPT | Mod: GO | Performed by: OCCUPATIONAL THERAPIST

## 2019-11-22 PROCEDURE — 25000132 ZZH RX MED GY IP 250 OP 250 PS 637: Mod: GY | Performed by: ORTHOPAEDIC SURGERY

## 2019-11-22 PROCEDURE — 97110 THERAPEUTIC EXERCISES: CPT | Mod: GP | Performed by: PHYSICAL THERAPY ASSISTANT

## 2019-11-22 PROCEDURE — 85018 HEMOGLOBIN: CPT | Performed by: ORTHOPAEDIC SURGERY

## 2019-11-22 PROCEDURE — 97535 SELF CARE MNGMENT TRAINING: CPT | Mod: GO | Performed by: OCCUPATIONAL THERAPIST

## 2019-11-22 PROCEDURE — 97530 THERAPEUTIC ACTIVITIES: CPT | Mod: GP | Performed by: PHYSICAL THERAPY ASSISTANT

## 2019-11-22 PROCEDURE — 97530 THERAPEUTIC ACTIVITIES: CPT | Mod: GP

## 2019-11-22 PROCEDURE — 97116 GAIT TRAINING THERAPY: CPT | Mod: GP | Performed by: PHYSICAL THERAPY ASSISTANT

## 2019-11-22 PROCEDURE — 82947 ASSAY GLUCOSE BLOOD QUANT: CPT | Performed by: ORTHOPAEDIC SURGERY

## 2019-11-22 PROCEDURE — 36415 COLL VENOUS BLD VENIPUNCTURE: CPT | Performed by: ORTHOPAEDIC SURGERY

## 2019-11-22 PROCEDURE — 97116 GAIT TRAINING THERAPY: CPT | Mod: GP

## 2019-11-22 RX ORDER — OXYCODONE HYDROCHLORIDE 5 MG/1
5-10 TABLET ORAL EVERY 6 HOURS PRN
Qty: 40 TABLET | Refills: 0 | Status: SHIPPED | OUTPATIENT
Start: 2019-11-22 | End: 2022-06-08

## 2019-11-22 RX ORDER — AMOXICILLIN 250 MG
1-2 CAPSULE ORAL 2 TIMES DAILY
Qty: 30 TABLET | Refills: 0 | Status: SHIPPED | OUTPATIENT
Start: 2019-11-22 | End: 2022-06-08

## 2019-11-22 RX ORDER — CELECOXIB 200 MG/1
200 CAPSULE ORAL DAILY
Qty: 30 CAPSULE | Refills: 0 | Status: SHIPPED | OUTPATIENT
Start: 2019-11-22 | End: 2022-06-08

## 2019-11-22 RX ORDER — KETOROLAC TROMETHAMINE 10 MG/1
10 TABLET, FILM COATED ORAL EVERY 8 HOURS
Qty: 6 TABLET | Refills: 0 | Status: SHIPPED | OUTPATIENT
Start: 2019-11-22 | End: 2019-11-24

## 2019-11-22 RX ADMIN — OXYCODONE HYDROCHLORIDE 5 MG: 5 TABLET ORAL at 12:26

## 2019-11-22 RX ADMIN — OXYCODONE HYDROCHLORIDE 5 MG: 5 TABLET ORAL at 03:23

## 2019-11-22 RX ADMIN — ASPIRIN 325 MG: 325 TABLET, DELAYED RELEASE ORAL at 08:30

## 2019-11-22 RX ADMIN — KETOROLAC TROMETHAMINE 15 MG: 15 INJECTION, SOLUTION INTRAMUSCULAR; INTRAVENOUS at 14:13

## 2019-11-22 RX ADMIN — ACETAMINOPHEN 975 MG: 325 TABLET, FILM COATED ORAL at 06:42

## 2019-11-22 RX ADMIN — KETOROLAC TROMETHAMINE 15 MG: 15 INJECTION, SOLUTION INTRAMUSCULAR; INTRAVENOUS at 08:29

## 2019-11-22 RX ADMIN — OXYCODONE HYDROCHLORIDE 5 MG: 5 TABLET ORAL at 06:42

## 2019-11-22 RX ADMIN — KETOROLAC TROMETHAMINE 15 MG: 15 INJECTION, SOLUTION INTRAMUSCULAR; INTRAVENOUS at 03:21

## 2019-11-22 RX ADMIN — CEFAZOLIN SODIUM 2 G: 2 INJECTION, SOLUTION INTRAVENOUS at 03:20

## 2019-11-22 ASSESSMENT — ACTIVITIES OF DAILY LIVING (ADL): PREVIOUS_RESPONSIBILITIES: MEAL PREP;HOUSEKEEPING;LAUNDRY;SHOPPING;YARDWORK;MEDICATION MANAGEMENT;FINANCES;DRIVING

## 2019-11-22 NOTE — PROGRESS NOTES
"Orthopedic Surgery  11/22/2019  POD #1    S: Patient voices no complaints today.   Some light headedness, improving    O: Blood pressure 105/55, pulse 64, temperature 97.7  F (36.5  C), temperature source Oral, resp. rate 14, height 1.575 m (5' 2\"), weight 87 kg (191 lb 12.8 oz), SpO2 93 %.  Lab Results   Component Value Date    HGB 11.5 11/22/2019     Neurovascularly intact.  Calves are negative bilaterally, both soft and nontender.  The dressing is C/D/I.  The wound looks good with minimal erythema of the surrounding skin.    A: Ms. Escoto is doing well status post Procedure(s):  RIGHT TOTAL KNEE ARTHROPLASTY.    P: No dressing change, patient to remove outer dressing on POD3, leaving mesh adhesive in place.  Continue physical therapy. Discharge to home today if BP stable    Gopal Lo  [phone]    "

## 2019-11-22 NOTE — PROGRESS NOTES
[facility]      OUTPATIENT PHYSICAL THERAPY EVALUATION  PLAN OF TREATMENT FOR OUTPATIENT REHABILITATION  (COMPLETE FOR INITIAL CLAIMS ONLY)  Patient's Last Name, First Name, M.I.  YOB: 1945  Susanne Escoto                        Provider's Name  [facility] Medical Record No.  [id number]                               Onset Date:  11/21/19   Start of Care Date:  11/21/19      Type:     _X_PT   ___OT   ___SLP Medical Diagnosis:  (TKA)                        PT Diagnosis:  Difficulty with gait   Visits from SOC:  1   _________________________________________________________________________________  Plan of Treatment/Functional Goals    Planned Interventions: Cryotherapy,    bed mobility training, gait training, ROM, strengthening, transfer training, home program guidelines, progressive activity/exercise,       Goals: See Physical Therapy Goals on Care Plan in [facility] electronic health record.    Therapy Frequency: 2x/day  Predicted Duration of Therapy Intervention: 2 days  _________________________________________________________________________________    I CERTIFY THE NEED FOR THESE SERVICES FURNISHED UNDER        THIS PLAN OF TREATMENT AND WHILE UNDER MY CARE     (Physician co-signature of this document indicates review and certification of the therapy plan).                Certification date from: 11/21/19, Certification date to: 11/22/19    Referring Physician: Dr. Lo            Initial Assessment        See Physical Therapy evaluation dated 11/21/19 in Epic electronic health record.

## 2019-11-22 NOTE — PLAN OF CARE
Patient plan: home with assist from son   Current status: Orders received, eval completed, treatment initiated. Pt is a 74 yo female POD 1 R TKA, pt was previously IND in all ADLs. Pt has tub/shower with transfer bench.     Pt completed transfers and ambulation with CGA as pt reports dizziness, BP taken, [id number], standing 92/45, trail after rest standing at 86/57 RN notified.  Pt O2 sats 100% and down to 82-87% at times with HR 67-72. Pt demo'd ability to complete LE dressing with SBA. Pt and daughter participated in ed for fall prevention and recommendations.   Anticipated status at discharge: Pt will require assist with mobility at this time due to symptoms, pt would like to purchase reacher. Assist for ADLs and IADLs as needed.   Occupational Therapy Discharge Summary    Reason for therapy discharge:    Discharged to home.    Progress towards therapy goal(s). See goals on Care Plan in [facility] electronic health record for goal details.  Goals not met.  Barriers to achieving goals:   discharge from facility.    Therapy recommendation(s):    No further therapy is recommended.

## 2019-11-22 NOTE — PROGRESS NOTES
11/21/19 1517   Quick Adds   Type of Visit Initial PT Evaluation   Living Environment   Lives With alone   Living Arrangements house   Home Accessibility stairs to enter home;stairs within home   Number of Stairs, Main Entrance 2   Stair Railings, Main Entrance railing on left side (ascending)   Number of Stairs, Within Home, Primary 10   Stair Railings, Within Home, Primary railing on right side (ascending)   Transportation Anticipated car, drives self;family or friend will provide   Self-Care   Usual Activity Tolerance good   Current Activity Tolerance fair   Regular Exercise No   Equipment Currently Used at Home none  (pt has a cane but rarely uses)   Activity/Exercise/Self-Care Comment Pt is independent with all ADLs, IADLs   Functional Level Prior   Ambulation 0-->independent   Transferring 0-->independent   Toileting 0-->independent   Bathing 0-->independent   Communication 0-->understands/communicates without difficulty   Swallowing 0-->swallows foods/liquids without difficulty   Cognition 0 - no cognition issues reported   Fall history within last six months no   General Information   Onset of Illness/Injury or Date of Surgery - Date 11/21/19   Referring Physician Dr. Lo   Patient/Family Goals Statement To go home tomorrow. OP Monday   Pertinent History of Current Problem (include personal factors and/or comorbidities that impact the POC) Pt is a 73 year old female POD#0 TKA right   Precautions/Limitations no known precautions/limitations;fall precautions;oxygen therapy device and L/min   Weight-Bearing Status - LLE full weight-bearing   Weight-Bearing Status - RLE weight-bearing as tolerated   Cognitive Status Examination   Orientation orientation to person, place and time   Level of Consciousness lethargic/somnolent   Follows Commands and Answers Questions 100% of the time   Personal Safety and Judgment intact   Memory intact   Pain Assessment   Patient Currently in Pain Yes, see Vital Sign  "flowsheet   Integumentary/Edema   Integumentary/Edema Comments right leg wrapped   Range of Motion (ROM)   ROM Comment Decreased right knee consistent with surgery. other extremities WFL   Strength   Strength Comments WFL except for right knee consistent with surgery   Bed Mobility   Bed Mobility Comments supine to and from sit: Kayy   Transfer Skills   Transfer Comments sit to stand: Kayy   Gait   Gait Comments Pt ambulated with FWW a few steps with Kayy of one and assst for equipment   Balance   Balance Comments requires FWW   Sensory Examination   Sensory Perception no deficits were identified   Coordination   Coordination no deficits were identified   Modality Interventions   Planned Modality Interventions Cryotherapy   General Therapy Interventions   Planned Therapy Interventions bed mobility training;gait training;ROM;strengthening;transfer training;home program guidelines;progressive activity/exercise   Clinical Impression   Criteria for Skilled Therapeutic Intervention yes, treatment indicated   PT Diagnosis Difficulty with gait   Influenced by the following impairments Pain, decreased right knee ROM and Strength, dizziness   Functional limitations due to impairments increased assist with functional mobilty compared to baseline   Clinical Presentation Stable/Uncomplicated   Clinical Presentation Rationale clinical judgement   Clinical Decision Making (Complexity) Low complexity   Therapy Frequency 2x/day   Predicted Duration of Therapy Intervention (days/wks) 2 days   Anticipated Equipment Needs at Discharge front wheeled walker   Anticipated Discharge Disposition Home with Outpatient Therapy   Risk & Benefits of therapy have been explained Yes   Patient, Family & other staff in agreement with plan of care Yes   Therapy Certification   Start of care date 11/21/19   Certification date from 11/21/19   Certification date to 11/22/19   Medical Diagnosis   (TKA)   [facility] AM-PAC TM \"6 Clicks\"   2016, " "Trustees of [facility], under license to [employer].  All rights reserved.   6 Clicks Short Forms Basic Mobility Inpatient Short Form   [facility] AM-PAC  \"6 Clicks\" V.2 Basic Mobility Inpatient Short Form   1. Turning from your back to your side while in a flat bed without using bedrails? 3 - A Little   2. Moving from lying on your back to sitting on the side of a flat bed without using bedrails? 3 - A Little   3. Moving to and from a bed to a chair (including a wheelchair)? 3 - A Little   4. Standing up from a chair using your arms (e.g., wheelchair, or bedside chair)? 3 - A Little   5. To walk in hospital room? 3 - A Little   6. Climbing 3-5 steps with a railing? 3 - A Little   Basic Mobility Raw Score (Score out of 24.Lower scores equate to lower levels of function) 18   Total Evaluation Time   Total Evaluation Time (Minutes) 10     "

## 2019-11-22 NOTE — PROGRESS NOTES
Patient vital signs are at baseline: Yes  Patient able to ambulate as they were prior to admission or with assist devices provided by therapies during their stay:  Yes  Patient MUST void prior to discharge:  No,  Reason:  Kramer in place  Patient able to tolerate oral intake:  Yes  Pain has adequate pain control using Oral analgesics:  Yes

## 2019-11-22 NOTE — PROGRESS NOTES
Patient vital signs are at baseline: Yes  Patient able to ambulate as they were prior to admission or with assist devices provided by therapies during their stay:  Yes  Patient MUST void prior to discharge:  Yes  Patient able to tolerate oral intake:  Yes  Pain has adequate pain control using Oral analgesics:  Yes   Pt denies feeling light headedness and dizziness after sitting on the edge of bed. Pain managed with oxycodone. Up with SBA. Discharge instruction reviewed and all questions answered. Discharged to home with daughter.

## 2019-11-22 NOTE — PROVIDER NOTIFICATION
Ac (PA) notified about pt's feeling lightheaded and dizziness with PT/OT. Per PA, Pt should be able to leave but to make sure pt has someone with her and pt needs to have adequate fluid intake.

## 2019-11-22 NOTE — PROGRESS NOTES
11/22/19 9550   Quick Adds   Quick Adds Certification   Type of Visit Initial Occupational Therapy Evaluation   Living Environment   Lives With alone   Living Arrangements house   Home Accessibility stairs to enter home;stairs within home   Stair Railings, Main Entrance railing on left side (ascending)   Number of Stairs, Within Home, Primary 10   Stair Railings, Within Home, Primary railing on right side (ascending)   Transportation Anticipated car, drives self;family or friend will provide   Living Environment Comment Pt has tub/shower with transfer bench   Self-Care   Usual Activity Tolerance good   Current Activity Tolerance fair   Regular Exercise No   Equipment Currently Used at Home none  (pt has a cane but rarely uses)   Activity/Exercise/Self-Care Comment Pt is independent with all ADLs, IADLs   Functional Level   Ambulation 0-->independent   Transferring 0-->independent   Toileting 0-->independent   Bathing 0-->independent   Dressing 0-->independent   Eating 0-->independent   Communication 0-->understands/communicates without difficulty   Swallowing 0-->swallows foods/liquids without difficulty   Cognition 0 - no cognition issues reported   Fall history within last six months no   Which of the above functional risks had a recent onset or change? ambulation;transferring   Prior Functional Level Comment has a cane for knee pain, has more trouble with getting on knees for cleaning. Was IND in all ADL and IADLs   General Information   Onset of Illness/Injury or Date of Surgery - Date 11/21/19   Referring Physician Gopal Snowden MD   Patient/Family Goals Statement none specifically stated   Additional Occupational Profile Info/Pertinent History of Current Problem Pt is a 72 yo female POD 1 R TKA   Precautions/Limitations fall precautions   Weight-Bearing Status - RLE weight-bearing as tolerated   Cognitive Status Examination   Orientation orientation to person, place and time   Level of Consciousness  alert   Follows Commands (Cognition) WNL   Visual Perception   Visual Perception Wears glasses   Pain Assessment   Patient Currently in Pain No   Range of Motion (ROM)   ROM Comment BUE WFL for ADLs   Strength   Strength Comments BUE WFL for ADLs   Hand Strength   Hand Strength Comments BUE WFL for ADLs   Coordination   Upper Extremity Coordination No deficits were identified   Transfer Skill: Bed to Chair/Chair to Bed   Level of [place]: Bed to Chair contact guard   Physical Assist/Nonphysical Assist: Bed to Chair supervision;verbal cues   Weight-Bearing Restrictions weight-bearing as tolerated   Assistive Device - Transfer Skill Bed to Chair Chair to Bed Rehab Eval rolling walker   Transfer Skill: Sit to Stand   Level of [place]: Sit/Stand contact guard   Physical Assist/Nonphysical Assist: Sit/Stand supervision;verbal cues   Transfer Skill: Sit to Stand weight-bearing as tolerated   Assistive Device for Transfer: Sit/Stand rolling walker   Upper Body Dressing   Level of [place]: Dress Upper Body independent   Lower Body Dressing   Level of [place]: Dress Lower Body stand-by assist   Toileting   Level of [place]: Toilet contact guard   Grooming   Level of [place]: Grooming independent   Eating/Self Feeding   Level of [place]: Eating independent   Instrumental Activities of Daily Living (IADL)   Previous Responsibilities meal prep;housekeeping;laundry;shopping;yardwork;medication management;finances;driving   Activities of Daily Living Analysis   Impairments Contributing to Impaired Activities of Daily Living pain;strength decreased;ROM decreased  (dizziness)   General Therapy Interventions   Planned Therapy Interventions ADL retraining;IADL retraining;transfer training;home program guidelines;progressive activity/exercise   Clinical Impression   Criteria for Skilled Therapeutic Interventions Met yes, treatment indicated   OT Diagnosis reduced IND in ADLs   Influenced by the  "following impairments reduced activity [facility]   Assessment of Occupational Performance 1-3 Performance Deficits   Identified Performance Deficits ambulation, toileting   Clinical Decision Making (Complexity) Low complexity   Therapy Frequency Daily   Predicted Duration of Therapy Intervention (days/wks) 2 days   Anticipated Equipment Needs at Discharge reacher   Anticipated Discharge Disposition Home with Assist   Risks and Benefits of Treatment have been explained. Yes   Patient, Family & other staff in agreement with plan of care Yes   Therapy Certification   Start of Care Date 11/21/19   Certification date from 11/21/19   Certification date to 11/22/19   Medical Diagnosis TKA   Certification I certify the need for these services furnished under this plan of treatment and while under my care.  (Physician co-signature of this document indicates review and certification of the therapy plan).   [facility] [employer] TM \"6 Clicks\"   2016, Trustees of [facility], under license to [employer].  All rights reserved.   6 Clicks Short Forms Basic Mobility Inpatient Short Form;Daily Activity Inpatient Short Form   [facility] Money Toolkit-PAC  \"6 Clicks\" Daily Activity Inpatient Short Form   1. Putting on and taking off regular lower body clothing? 3 - A Little   2. Bathing (including washing, rinsing, drying)? 3 - A Little   3. Toileting, which includes using toilet, bedpan or urinal? 3 - A Little   4. Putting on and taking off regular upper body clothing? 4 - None   5. Taking care of personal grooming such as brushing teeth? 4 - None   6. Eating meals? 4 - None   Daily Activity Raw Score (Score out of 24.Lower scores equate to lower levels of function) 21   Total Evaluation Time   Total Evaluation Time (Minutes) 5     "

## 2019-11-22 NOTE — PROGRESS NOTES
Spiritual Health    SH visited Pt per request. Pt was visiting with daughter at time of SH visit. Pt is feeling well, she is only in pain when she moves her knee. Pt is excited that she already walked yesterday following the surgery. Pt is planning to discharge soon and her daughter will be taking her home. Pt has no additional SH needs at this time.     SH listened and provided support.     Pt is optimistic about health condition.     SH will remain available as needed.     Justine Rasmussen  Chaplain Resident

## 2019-11-22 NOTE — PLAN OF CARE
[facility]      OUTPATIENT OCCUPATIONAL THERAPY  EVALUATION  PLAN OF TREATMENT FOR OUTPATIENT REHABILITATION  (COMPLETE FOR INITIAL CLAIMS ONLY)  Patient's Last Name, First Name, M.I.  YOB: 1945  Susanne Escoto                          Provider's Name  [facility] Medical Record No.  [id number]                               Onset Date:  11/21/19   Start of Care Date:  11/21/19     Type:     ___PT   _X_OT   ___SLP Medical Diagnosis:  TKA                        OT Diagnosis:  reduced IND in ADLs   Visits from SOC:  1   _________________________________________________________________________________  Plan of Treatment/Functional Goals    Planned Interventions: ADL retraining, IADL retraining, transfer training, home program guidelines, progressive activity/exercise,       Goals: See Occupational Therapy Goals on Care Plan in [employer] electronic health record.    Therapy Frequency: Daily  Predicted Duration of Therapy Intervention: 2 days  _________________________________________________________________________________    I CERTIFY THE NEED FOR THESE SERVICES FURNISHED UNDER        THIS PLAN OF TREATMENT AND WHILE UNDER MY CARE     (Physician co-signature of this document indicates review and certification of the therapy plan).              Certification date from: 11/21/19, Certification date to: 11/22/19    Referring Physician: Gopal Snowden MD            Initial Assessment        See Occupational Therapy evaluation dated 11/21/19 in Epic electronic health record.

## 2019-11-22 NOTE — PROGRESS NOTES
Patient vital signs are at baseline: Yes  Patient able to ambulate as they were prior to admission or with assist devices provided by therapies during their stay:  Yes  Patient MUST void prior to discharge:  No,  Reason: Kramer removed this AM, patient due to void.   Patient able to tolerate oral intake:  Yes  Pain has adequate pain control using Oral analgesics:  Yes

## 2019-11-22 NOTE — PROGRESS NOTES
Patient vital signs are at baseline: yes  Patient able to ambulate as they were prior to admission or with assist devices provided by therapies during their stay:  no  Patient MUST void prior to discharge:  No-has duron  Patient able to tolerate oral intake:  yes  Pain has adequate pain control using Oral analgesics:  yes  Susanne Escoto MRN# [id number]   Age: 73 year old YOB: 1945     Pt on floor 1 hr and PT came and dangled pt and had her walk in room as she was light-headed.  Duron patent.  CMS good bilaterally to ft.  Knee drsg dry and intact.  Rates pain #4-5 and given Tylenol and Oxycodone.  Denies nausea.

## 2019-11-22 NOTE — PLAN OF CARE
Patient plan: To go home with OP PT per [facility] plan  Current status: Mod IND x 2 reps with supine to sit. Sit to stand with min to SBA with cues for hand placement with FWW. Pt initially with c/o lightheadedness/foggyness.  MO=061/46, HR=68 sitting, standin/54.  2 prolonged stands, no worsening of symptoms. Amb 15 ft to the BR with min A and FWW. SBA with Sit to stand, IND pericares-able to void, SBA with sink side handwashing. Amb another 75ft with FWW. BP improved post amb to 111/60, HR=80bpm. Pt instructed with right TKA ex and given handout. AAROM right knee: -3 to 103 deg. RN notified of session. Pt encouraged to sit in the chair to improve upright tolerance.   Anticipated status at discharge: bed mobility Arcadio, transfers [place]cadio with FWW, gait with SBA with FWW and stairs with min A using railing.

## 2019-11-22 NOTE — PLAN OF CARE
Patient plan: To go home with OP PT per [facility] plan  Current status:  Pt arrived to PT gym with no c/o dizziness.  Seated BP was 112/68.  Pt performed bed mobility with min assist and sit to/from stand transfers with SBA and cues for safety.  Gait training x 250 ft using wheeled walker and SBA.  Slow pace.  Good stability.  Fatigued at end of gait distance.  Performed 3 stairs x 1 trial using 1 rail and cane with CGA.  Knee AAROM is 2-98 degrees.  Anticipated status at discharge:  Assist with transfers as needed and stairs.  Use of wheeled walker with gait.

## 2019-11-23 NOTE — PLAN OF CARE
Physical Therapy Discharge Summary    Reason for therapy discharge:    Discharged to home. with OP PT.     Progress towards therapy goal(s). See goals on Care Plan in [facility] electronic health record for goal details.  Goals partially met.  Barriers to achieving goals:   discharge from facility.    Therapy recommendation(s):    Continued therapy is recommended.  Rationale/Recommendations:  to maximize his level of IND mobility without use of an assistive device. .  Continue home exercise program. Recommend min A with bed mobility, SBA with transfers and gait and min A with stairs. Recommend use of FWW for all mobility. **Pt not seen by discharging therapist on this date, note written based on previous treating therapist's notes and recommendations.

## 2019-11-23 NOTE — OP NOTE
Operative Note    Susanne Escoto MRN# [id number]   YOB: 1945 Age: 73 year old   Date of Procedure: 11/21/2019    1st Assistant: Ac Pryor PA-C    PREOPERATIVE DIAGNOSIS: Right knee osteoarthritis, failure to respond to conservative management.     POSTOPERATIVE DIAGNOSIS: Right knee osteoarthritis, failure to respond to conservative management.     PROCEDURE: Right total knee arthroplasty, Rosa Triathalon components CR/CS.  Tourniquet-less technique.     DESCRIPTION OF PROCEDURE: Susanne Escoto was brought to the operating room. After satisfactory anesthesia, the right lower extremity was prepped and draped in the usual sterile fashion. The patient received 1 gram of Ancef preoperatively.     Straight anterior incision was made. Dissection was carried down to the extensor mechanism. Medial arthrotomy was made.  Advanced OA was noted.   Patella was exposed, measured and resected to accept a size 32mm, asymmetric patella. The knee was then flexed up. Intramedullary guide was placed at 5 degrees as per the preoperative plan. The distal femoral cut was made followed by anteroposterior cuts and chamfer cuts.  Patient had poor bone quality, so a CR femur was utililized.   Femur sized to be a size 3. Attention was then directed to the tibia. Tibial cut was made perpendicular to the long axis of the tibia in both AP and lateral planes, 3 degree posterior slope. The tibia sized to be a size 2. Soft tissue balancing was performed. Trial reduction was performed and with a 11-mm CS insert, there was excellent soft tissue balancing, patellar tracking, alignment as well as motion. Trial components were removed. Tibial baseplate was prepared for size 2 baseplate. All 3 components were cemented in place without difficulty. Excess cement was removed. The real 11-mm CS insert was impacted in place and then the wound was closed in sequential layers including interrupted 0 Vicryl  and 0 ethibond as well as a running 1 Stratafix suture in the extensor mechanism, interrupted 2-0 Vicryl, running 2-0 Stratafix, and 3-0 subcuticular monocryl. A mesh dressing with skin glue was applied. Sterile dressing was applied. The patient left the operating room in satisfactory condition. Patient received 1 gm of tranexamic acid pre-op and at closure, a 3 minute dilute betadine soak was performed prior to closure, and one gram of vancomycin powder was placed deep and superficial prior to closure.    LAZARO ELLIS MD

## 2019-11-25 ENCOUNTER — THERAPY VISIT (OUTPATIENT)
Dept: PHYSICAL THERAPY | Facility: CLINIC | Age: 74
End: 2019-11-25
Payer: MEDICARE

## 2019-11-25 DIAGNOSIS — Z47.1 AFTERCARE FOLLOWING RIGHT KNEE JOINT REPLACEMENT SURGERY: ICD-10-CM

## 2019-11-25 DIAGNOSIS — Z96.651 AFTERCARE FOLLOWING RIGHT KNEE JOINT REPLACEMENT SURGERY: ICD-10-CM

## 2019-11-25 DIAGNOSIS — M25.561 ACUTE PAIN OF RIGHT KNEE: ICD-10-CM

## 2019-11-25 PROCEDURE — 97161 PT EVAL LOW COMPLEX 20 MIN: CPT | Mod: GP | Performed by: PHYSICAL THERAPIST

## 2019-11-25 PROCEDURE — 97110 THERAPEUTIC EXERCISES: CPT | Mod: GP | Performed by: PHYSICAL THERAPIST

## 2019-11-25 ASSESSMENT — ACTIVITIES OF DAILY LIVING (ADL)
PAIN: THE SYMPTOM AFFECTS MY ACTIVITY SLIGHTLY
AS_A_RESULT_OF_YOUR_KNEE_INJURY,_HOW_WOULD_YOU_RATE_YOUR_CURRENT_LEVEL_OF_DAILY_ACTIVITY?: NEARLY NORMAL
SIT WITH YOUR KNEE BENT: ACTIVITY IS NOT DIFFICULT
HOW_WOULD_YOU_RATE_THE_OVERALL_FUNCTION_OF_YOUR_KNEE_DURING_YOUR_USUAL_DAILY_ACTIVITIES?: NEARLY NORMAL
SWELLING: THE SYMPTOM AFFECTS MY ACTIVITY SLIGHTLY
GO UP STAIRS: NOT ANSWERED
GIVING WAY, BUCKLING OR SHIFTING OF KNEE: I DO NOT HAVE THE SYMPTOM
STAND: ACTIVITY IS NOT DIFFICULT
KNEEL ON THE FRONT OF YOUR KNEE: NOT ANSWERED
GO DOWN STAIRS: NOT ANSWERED
RISE FROM A CHAIR: ACTIVITY IS NOT DIFFICULT
KNEE_ACTIVITY_OF_DAILY_LIVING_SUM: 44
SQUAT: NOT ANSWERED
LIMPING: I DO NOT HAVE THE SYMPTOM
STIFFNESS: I HAVE THE SYMPTOM BUT IT DOES NOT AFFECT MY ACTIVITY
WALK: ACTIVITY IS NOT DIFFICULT
HOW_WOULD_YOU_RATE_THE_CURRENT_FUNCTION_OF_YOUR_KNEE_DURING_YOUR_USUAL_DAILY_ACTIVITIES_ON_A_SCALE_FROM_0_TO_100_WITH_100_BEING_YOUR_LEVEL_OF_KNEE_FUNCTION_PRIOR_TO_YOUR_INJURY_AND_0_BEING_THE_INABILITY_TO_PERFORM_ANY_OF_YOUR_USUAL_DAILY_ACTIVITIES?: 50
WEAKNESS: I HAVE THE SYMPTOM BUT IT DOES NOT AFFECT MY ACTIVITY

## 2019-11-25 NOTE — LETTER
DEPARTMENT OF HEALTH AND HUMAN SERVICES  CENTERS FOR MEDICARE & MEDICAID SERVICES    PLAN/UPDATED PLAN OF PROGRESS FOR OUTPATIENT REHABILITATION    PATIENTS NAME:  Susanne Escoto   : 1945  PROVIDER NUMBER:    [id number]  HICN: [id number]   PROVIDER NAME: [facility]  MEDICAL RECORD NUMBER: [id number]   START OF CARE DATE:  19   TYPE:  PT  PRIMARY/TREATMENT DIAGNOSIS: Aftercare following right knee joint replacement surgery  Acute pain of right knee  VISITS FROM START OF CARE:  Rxs Used: 1     [facility] Initial Evaluation  Subjective:  The history is provided by the patient. No  was used.   Type of problem:  Right knee.  This is a new condition.  Problem details: Pt reports having R TKA on 19 without complication.  She lives alone in a rambler. Patient reports pain:  Anterior. Radiates to:  Knee. Associated symptoms:  Loss of motion/stiffness and loss of strength. Symptoms are exacerbated by bending/squatting, descending stairs, ascending stairs, standing and weight bearing and relieved by rest and ice.  Susanne Escoto being seen for s/p R TKA.   Problem began 2019 and reported as 5/10 on pain scale. General health as reported by patient is good. Pertinent medical history includes:  Cancer, kidney disease and osteoarthritis.   Other medical allergies details: See EPIC. Current medications:  Pain medication and anti-inflammatory. Pain is described as aching and is constant. Pain is the same all the time. Since onset symptoms are gradually improving. Special tests:  X-ray. Patient is retired.   Barriers include:  None as reported by patient. Red flags:  None as reported by patient.                Objective:  Gait:  Gait Type:  Antalgic   Assistive Devices:  Walker      Knee Evaluation:  ROM:    AROM  Hyperextension: Left:     Right: 0  Extension: Left:    Right:  0  Flexion: Left:   Right:  122  PROM  Hyperextension: Left: 0    Right:  0  Extension: Left: 0    Right:  0  Flexion: Left: 126    Right:  102  Palpation:    Right knee tenderness present at:  Incisional    Assessment/Plan:    Patient is a 74 year old female with right side knee complaints.    Patient has the following significant findings with corresponding treatment plan.                Diagnosis 1:  S/p R TKA  Pain -  hot/cold therapy, self management, education, directional preference exercise and home program  Decreased ROM/flexibility - manual therapy and therapeutic exercise  Decreased strength - therapeutic exercise and therapeutic activities  Impaired balance - neuro re-education and therapeutic activities  PATIENTS NAME:  Susanne Escoto   : 1945        Decreased proprioception - neuro re-education and therapeutic activities  Inflammation - cold therapy  Impaired gait - gait training  Impaired muscle performance - neuro re-education  Decreased function - therapeutic activities    Therapy Evaluation Codes:   Previous and current functional limitations:  (See Goal Flow Sheet for this information)    Short term and Long term goals: (See Goal Flow Sheet for this information)     Communication ability:  Patient appears to be able to clearly communicate and understand verbal and written communication and follow directions correctly.  Treatment Explanation - The following has been discussed with the patient:   RX ordered/plan of care  Anticipated outcomes  Possible risks and side effects  This patient would benefit from PT intervention to resume normal activities.   Rehab potential is good.    Frequency:  2 X week, once daily  Duration:  for 6 weeks  Discharge Plan:  Achieve all LTG.  Independent in home treatment program.  Reach maximal therapeutic benefit.    Caregiver Signature/Credentials _____________________________ Date ________       Treating Provider: Jorge Ortiz, PT    I have reviewed and certified the need for  "these services and plan of treatment while under my care.        PHYSICIAN'S SIGNATURE:   _________________________________________  Date___________   Gopal Snowden MD    Certification period:  Beginning of Cert date period: 11/25/19 to  End of Cert period date: 02/22/20     Functional Level Progress Report: Please see attached \"Goal Flow sheet for Functional level.\"    ____X____ Continue Services or       ________ DC Services                Service dates: From  SOC Date: 11/25/19 date to present                         "

## 2019-11-25 NOTE — PROGRESS NOTES
[facility] Initial Evaluation  Subjective:  The history is provided by the patient. No  was used.   Type of problem:  Right knee    This is a new condition   Problem details: Pt reports having R TKA on 11/21/19 without complication.  She lives alone in a rambler.  .   Patient reports pain:  Anterior. Radiates to:  Knee. Associated symptoms:  Loss of motion/stiffness and loss of strength. Symptoms are exacerbated by bending/squatting, descending stairs, ascending stairs, standing and weight bearing and relieved by rest and ice.    Susanne Escoto being seen for s/p R TKA.   Problem began 11/21/2019.   and reported as 5/10 on pain scale. General health as reported by patient is good. Pertinent medical history includes:  Cancer, kidney disease and osteoarthritis.   Other medical allergies details: See EPIC.    Current medications:  Pain medication and anti-inflammatory.     Pain is described as aching and is constant. Pain is the same all the time. Since onset symptoms are gradually improving. Special tests:  X-ray.     Patient is retired.   Barriers include:  None as reported by patient.  Red flags:  None as reported by patient.                      Objective:    Gait:    Gait Type:  Antalgic   Assistive Devices:  Walker                                                        Knee Evaluation:  ROM:    AROM    Hyperextension: Left:     Right: 0  Extension: Left:    Right:  0  Flexion: Left:   Right: 122  PROM    Hyperextension: Left: 0    Right:  0  Extension: Left: 0    Right:  0  Flexion: Left: 126    Right:  102            Palpation:      Right knee tenderness present at:  Incisional            General     ROS    Assessment/Plan:    Patient is a 74 year old female with right side knee complaints.    Patient has the following significant findings with corresponding treatment plan.                Diagnosis 1:  S/p R TKA  Pain -  hot/cold therapy, self management, education,  directional preference exercise and home program  Decreased ROM/flexibility - manual therapy and therapeutic exercise  Decreased strength - therapeutic exercise and therapeutic activities  Impaired balance - neuro re-education and therapeutic activities  Decreased proprioception - neuro re-education and therapeutic activities  Inflammation - cold therapy  Impaired gait - gait training  Impaired muscle performance - neuro re-education  Decreased function - therapeutic activities    Therapy Evaluation Codes:   Previous and current functional limitations:  (See Goal Flow Sheet for this information)    Short term and Long term goals: (See Goal Flow Sheet for this information)     Communication ability:  Patient appears to be able to clearly communicate and understand verbal and written communication and follow directions correctly.  Treatment Explanation - The following has been discussed with the patient:   RX ordered/plan of care  Anticipated outcomes  Possible risks and side effects  This patient would benefit from PT intervention to resume normal activities.   Rehab potential is good.    Frequency:  2 X week, once daily  Duration:  for 6 weeks  Discharge Plan:  Achieve all LTG.  Independent in home treatment program.  Reach maximal therapeutic benefit.    Please refer to the daily flowsheet for treatment today, total treatment time and time spent performing 1:1 timed codes.

## 2019-11-26 ENCOUNTER — DOCUMENTATION ONLY (OUTPATIENT)
Dept: OTHER | Facility: CLINIC | Age: 74
End: 2019-11-26

## 2019-11-29 ENCOUNTER — THERAPY VISIT (OUTPATIENT)
Dept: PHYSICAL THERAPY | Facility: CLINIC | Age: 74
End: 2019-11-29
Payer: MEDICARE

## 2019-11-29 DIAGNOSIS — Z96.651 AFTERCARE FOLLOWING RIGHT KNEE JOINT REPLACEMENT SURGERY: ICD-10-CM

## 2019-11-29 DIAGNOSIS — Z47.1 AFTERCARE FOLLOWING RIGHT KNEE JOINT REPLACEMENT SURGERY: ICD-10-CM

## 2019-11-29 DIAGNOSIS — M25.561 ACUTE PAIN OF RIGHT KNEE: ICD-10-CM

## 2019-11-29 PROCEDURE — 97112 NEUROMUSCULAR REEDUCATION: CPT | Mod: GP | Performed by: PHYSICAL THERAPIST

## 2019-11-29 PROCEDURE — 97110 THERAPEUTIC EXERCISES: CPT | Mod: GP | Performed by: PHYSICAL THERAPIST

## 2019-11-29 PROCEDURE — 97530 THERAPEUTIC ACTIVITIES: CPT | Mod: GP | Performed by: PHYSICAL THERAPIST

## 2019-12-02 ENCOUNTER — THERAPY VISIT (OUTPATIENT)
Dept: PHYSICAL THERAPY | Facility: CLINIC | Age: 74
End: 2019-12-02
Payer: MEDICARE

## 2019-12-02 DIAGNOSIS — Z47.1 AFTERCARE FOLLOWING RIGHT KNEE JOINT REPLACEMENT SURGERY: ICD-10-CM

## 2019-12-02 DIAGNOSIS — Z96.651 AFTERCARE FOLLOWING RIGHT KNEE JOINT REPLACEMENT SURGERY: ICD-10-CM

## 2019-12-02 DIAGNOSIS — M25.561 ACUTE PAIN OF RIGHT KNEE: ICD-10-CM

## 2019-12-02 PROCEDURE — 97110 THERAPEUTIC EXERCISES: CPT | Mod: GP | Performed by: PHYSICAL THERAPIST

## 2019-12-02 PROCEDURE — 97112 NEUROMUSCULAR REEDUCATION: CPT | Mod: GP | Performed by: PHYSICAL THERAPIST

## 2019-12-06 ENCOUNTER — THERAPY VISIT (OUTPATIENT)
Dept: PHYSICAL THERAPY | Facility: CLINIC | Age: 74
End: 2019-12-06
Payer: MEDICARE

## 2019-12-06 DIAGNOSIS — M25.561 ACUTE PAIN OF RIGHT KNEE: ICD-10-CM

## 2019-12-06 DIAGNOSIS — Z47.1 AFTERCARE FOLLOWING RIGHT KNEE JOINT REPLACEMENT SURGERY: ICD-10-CM

## 2019-12-06 DIAGNOSIS — Z96.651 AFTERCARE FOLLOWING RIGHT KNEE JOINT REPLACEMENT SURGERY: ICD-10-CM

## 2019-12-06 PROCEDURE — 97110 THERAPEUTIC EXERCISES: CPT | Mod: GP | Performed by: PHYSICAL THERAPIST

## 2019-12-06 PROCEDURE — 97112 NEUROMUSCULAR REEDUCATION: CPT | Mod: GP | Performed by: PHYSICAL THERAPIST

## 2019-12-09 ENCOUNTER — THERAPY VISIT (OUTPATIENT)
Dept: PHYSICAL THERAPY | Facility: CLINIC | Age: 74
End: 2019-12-09
Payer: MEDICARE

## 2019-12-09 DIAGNOSIS — Z96.651 AFTERCARE FOLLOWING RIGHT KNEE JOINT REPLACEMENT SURGERY: ICD-10-CM

## 2019-12-09 DIAGNOSIS — Z47.1 AFTERCARE FOLLOWING RIGHT KNEE JOINT REPLACEMENT SURGERY: ICD-10-CM

## 2019-12-09 DIAGNOSIS — M25.561 ACUTE PAIN OF RIGHT KNEE: ICD-10-CM

## 2019-12-09 PROCEDURE — 97112 NEUROMUSCULAR REEDUCATION: CPT | Mod: GP | Performed by: PHYSICAL THERAPIST

## 2019-12-09 PROCEDURE — 97110 THERAPEUTIC EXERCISES: CPT | Mod: GP | Performed by: PHYSICAL THERAPIST

## 2019-12-09 NOTE — LETTER
[facility]IC [facility] PHYSICAL [facility]ERAPY  [address]  [facility] [address]  [phone]    2019    Re: Susanne Escoto   :   1945  MRN:  [id number]   REFERRING PHYSICIAN:   Gopal Snowden    [facility] PHYSICAL [facility]  Date of Initial Evaluation:  2019  Visits:  Rxs Used: 5  Reason for Referral:     Aftercare following right knee joint replacement surgery  Acute pain of right knee    PROGRESS  REPORT  Progress reporting period is from eval to 19.       SUBJECTIVE  Subjective changes noted by patient:  .  Subjective: Pt reports tolerateing more walking.  Still ambulating up and down steps one at a time.      Current pain level is  Current Pain level: 3/10.     Previous pain level was   Initial Pain level: 5/10.   Changes in function:  Yes (See Goal flowsheet attached for changes in current functional level)  Adverse reaction to treatment or activity: None    OBJECTIVE  Changes noted in objective findings:  Yes,   Objective: PROM: 3-0-120,  strength 3+/5 generally on right.  Ambulating without assistive device.     ASSESSMENT/PLAN  Updated problem list and treatment plan: Diagnosis 1:  S/p R TKA  Pain -  self management, education, directional preference exercise and home program  Decreased ROM/flexibility - manual therapy and therapeutic exercise  Decreased strength - therapeutic exercise and therapeutic activities  Impaired muscle performance - neuro re-education  Decreased function - therapeutic activities  STG/LTGs have been met or progress has been made towards goals:  Yes (See Goal flow sheet completed today.)  Assessment of Progress: The patient's condition is improving.  The patient's condition has potential to improve.  Self Management Plans:  Patient has been instructed in a home treatment program.  Patient is independent in a home treatment program.  I have re-evaluated this patient and find  that the nature, scope, duration and intensity of the therapy is appropriate for the medical condition of the patient.  Susanne continues to require the following intervention to meet STG and LTG's:  PT    Re: Susanne CARRASCO Marquismelissa   :   1945    Recommendations:  This patient would benefit from continued therapy.     Frequency:  1 X week, once daily  Duration:  for 6 weeks    Thank you for your referral.    INQUIRIES  Therapist: Jude Ortiz, PT  [facility] [place] [facility]  [address] [place] [address]  Phone: [phone]  Fax: [phone]

## 2019-12-09 NOTE — PROGRESS NOTES
PROGRESS  REPORT    Progress reporting period is from eval to 12/9/19.       SUBJECTIVE  Subjective changes noted by patient:  .  Subjective: Pt reports tolerateing more walking.  Still ambulating up and down steps one at a time.      Current pain level is  Current Pain level: 3/10.     Previous pain level was   Initial Pain level: 5/10.   Changes in function:  Yes (See Goal flowsheet attached for changes in current functional level)  Adverse reaction to treatment or activity: None    OBJECTIVE  Changes noted in objective findings:  Yes,   Objective: PROM: 3-0-120,  strength 3+/5 generally on right.  Ambulating without assistive device.     ASSESSMENT/PLAN  Updated problem list and treatment plan: Diagnosis 1:  S/p R TKA  Pain -  self management, education, directional preference exercise and home program  Decreased ROM/flexibility - manual therapy and therapeutic exercise  Decreased strength - therapeutic exercise and therapeutic activities  Impaired muscle performance - neuro re-education  Decreased function - therapeutic activities  STG/LTGs have been met or progress has been made towards goals:  Yes (See Goal flow sheet completed today.)  Assessment of Progress: The patient's condition is improving.  The patient's condition has potential to improve.  Self Management Plans:  Patient has been instructed in a home treatment program.  Patient is independent in a home treatment program.  I have re-evaluated this patient and find that the nature, scope, duration and intensity of the therapy is appropriate for the medical condition of the patient.  Susanne continues to require the following intervention to meet STG and LTG's:  PT    Recommendations:  This patient would benefit from continued therapy.     Frequency:  1 X week, once daily  Duration:  for 6 weeks        Please refer to the daily flowsheet for treatment today, total treatment time and time spent performing 1:1 timed codes.

## 2019-12-12 ENCOUNTER — THERAPY VISIT (OUTPATIENT)
Dept: PHYSICAL THERAPY | Facility: CLINIC | Age: 74
End: 2019-12-12
Payer: MEDICARE

## 2019-12-12 DIAGNOSIS — Z96.651 AFTERCARE FOLLOWING RIGHT KNEE JOINT REPLACEMENT SURGERY: ICD-10-CM

## 2019-12-12 DIAGNOSIS — Z47.1 AFTERCARE FOLLOWING RIGHT KNEE JOINT REPLACEMENT SURGERY: ICD-10-CM

## 2019-12-12 DIAGNOSIS — M25.561 ACUTE PAIN OF RIGHT KNEE: ICD-10-CM

## 2019-12-12 PROCEDURE — 97112 NEUROMUSCULAR REEDUCATION: CPT | Mod: GP | Performed by: PHYSICAL THERAPIST

## 2019-12-12 PROCEDURE — 97110 THERAPEUTIC EXERCISES: CPT | Mod: GP | Performed by: PHYSICAL THERAPIST

## 2019-12-12 ASSESSMENT — ACTIVITIES OF DAILY LIVING (ADL)
PAIN: THE SYMPTOM AFFECTS MY ACTIVITY SLIGHTLY
SWELLING: I HAVE THE SYMPTOM BUT IT DOES NOT AFFECT MY ACTIVITY
WEAKNESS: I HAVE THE SYMPTOM BUT IT DOES NOT AFFECT MY ACTIVITY
RISE FROM A CHAIR: ACTIVITY IS NOT DIFFICULT
STAND: ACTIVITY IS NOT DIFFICULT
HOW_WOULD_YOU_RATE_THE_CURRENT_FUNCTION_OF_YOUR_KNEE_DURING_YOUR_USUAL_DAILY_ACTIVITIES_ON_A_SCALE_FROM_0_TO_100_WITH_100_BEING_YOUR_LEVEL_OF_KNEE_FUNCTION_PRIOR_TO_YOUR_INJURY_AND_0_BEING_THE_INABILITY_TO_PERFORM_ANY_OF_YOUR_USUAL_DAILY_ACTIVITIES?: 80
RAW_SCORE: 54
WALK: ACTIVITY IS NOT DIFFICULT
GIVING WAY, BUCKLING OR SHIFTING OF KNEE: I DO NOT HAVE THE SYMPTOM
KNEE_ACTIVITY_OF_DAILY_LIVING_SUM: 54
GO DOWN STAIRS: ACTIVITY IS SOMEWHAT DIFFICULT
AS_A_RESULT_OF_YOUR_KNEE_INJURY,_HOW_WOULD_YOU_RATE_YOUR_CURRENT_LEVEL_OF_DAILY_ACTIVITY?: NEARLY NORMAL
SIT WITH YOUR KNEE BENT: ACTIVITY IS NOT DIFFICULT
STIFFNESS: I HAVE THE SYMPTOM BUT IT DOES NOT AFFECT MY ACTIVITY
LIMPING: I HAVE THE SYMPTOM BUT IT DOES NOT AFFECT MY ACTIVITY
SQUAT: ACTIVITY IS MINIMALLY DIFFICULT
KNEEL ON THE FRONT OF YOUR KNEE: I AM UNABLE TO DO THE ACTIVITY
HOW_WOULD_YOU_RATE_THE_OVERALL_FUNCTION_OF_YOUR_KNEE_DURING_YOUR_USUAL_DAILY_ACTIVITIES?: NEARLY NORMAL
GO UP STAIRS: ACTIVITY IS SOMEWHAT DIFFICULT
KNEE_ACTIVITY_OF_DAILY_LIVING_SCORE: 77.14

## 2019-12-12 NOTE — LETTER
[facility] PHYSICAL THERAPY  [address]  [facility] [address]  [phone]    2019    Re: Susanne Escoto   :   1945  MRN:  [id number]   REFERRING PHYSICIAN:   Gopal Snowden    [facility] PHYSICAL [facility]  Date of Initial Evaluation:  19  Visits:  Rxs Used: 6  Reason for Referral: Aftercare following right knee joint replacement surgery; Acute pain of right knee    PROGRESS  REPORT  Progress reporting period is from eval to 19.       SUBJECTIVE  Subjective changes noted by patient:  Subjective: Pt reports continued progress and confidence walking without assistive device.  Strength slowly returning     Current pain level is  Current Pain level: 2/10.     Previous pain level was   Initial Pain level: 5/10.   Changes in function:  Yes (See Goal flowsheet attached for changes in current functional level)  Adverse reaction to treatment or activity: None    OBJECTIVE  Changes noted in objective findings:  Yes,   Objective: Stength 3+/5 for R knee flex and ext,  PROM 3-0-120     ASSESSMENT/PLAN  Updated problem list and treatment plan: Diagnosis 1:  S/p R TKA  Pain -  self management, education, directional preference exercise and home program  Decreased ROM/flexibility - manual therapy and therapeutic exercise  Decreased strength - therapeutic exercise and therapeutic activities  Impaired muscle performance - neuro re-education  Decreased function - therapeutic activities  STG/LTGs have been met or progress has been made towards goals:  Yes (See Goal flow sheet completed today.)  Assessment of Progress: The patient's condition is improving.  The patient's condition has potential to improve.  Self Management Plans:  Patient has been instructed in a home treatment program.  Patient is independent in a home treatment program.  I have re-evaluated this patient and find that the nature, scope,  duration and intensity of the therapy is appropriate for the medical condition of the patient.  Susanne continues to require the following intervention to meet STG and LTG's:  PT    Recommendations:  This patient would benefit from continued therapy.     Frequency:  1 X week, once daily  Duration:  for 6 weeks    Thank you for your referral.    INQUIRIES  Therapist: Jorge Ortiz, PT  [facility] [place] [facility]  [address] [place] [id number]  Phone: [phone]  Fax: [phone]

## 2019-12-12 NOTE — PROGRESS NOTES
PROGRESS  REPORT    Progress reporting period is from eval to 12/12/19.       SUBJECTIVE  Subjective changes noted by patient:  .  Subjective: Pt reports continued progress and confidence walking without assistive device.  Strength slowly returning     Current pain level is  Current Pain level: 2/10.     Previous pain level was   Initial Pain level: 5/10.   Changes in function:  Yes (See Goal flowsheet attached for changes in current functional level)  Adverse reaction to treatment or activity: None    OBJECTIVE  Changes noted in objective findings:  Yes,   Objective: Stength 3+/5 for R knee flex and ext,  PROM 3-0-120     ASSESSMENT/PLAN  Updated problem list and treatment plan: Diagnosis 1:  S/p R TKA  Pain -  self management, education, directional preference exercise and home program  Decreased ROM/flexibility - manual therapy and therapeutic exercise  Decreased strength - therapeutic exercise and therapeutic activities  Impaired muscle performance - neuro re-education  Decreased function - therapeutic activities  STG/LTGs have been met or progress has been made towards goals:  Yes (See Goal flow sheet completed today.)  Assessment of Progress: The patient's condition is improving.  The patient's condition has potential to improve.  Self Management Plans:  Patient has been instructed in a home treatment program.  Patient is independent in a home treatment program.  I have re-evaluated this patient and find that the nature, scope, duration and intensity of the therapy is appropriate for the medical condition of the patient.  Susanne continues to require the following intervention to meet STG and LTG's:  PT    Recommendations:  This patient would benefit from continued therapy.     Frequency:  1 X week, once daily  Duration:  for 6 weeks        Please refer to the daily flowsheet for treatment today, total treatment time and time spent performing 1:1 timed codes.

## 2019-12-16 ENCOUNTER — THERAPY VISIT (OUTPATIENT)
Dept: PHYSICAL THERAPY | Facility: CLINIC | Age: 74
End: 2019-12-16
Payer: MEDICARE

## 2019-12-16 DIAGNOSIS — M25.561 ACUTE PAIN OF RIGHT KNEE: ICD-10-CM

## 2019-12-16 DIAGNOSIS — Z47.1 AFTERCARE FOLLOWING RIGHT KNEE JOINT REPLACEMENT SURGERY: ICD-10-CM

## 2019-12-16 DIAGNOSIS — Z96.651 AFTERCARE FOLLOWING RIGHT KNEE JOINT REPLACEMENT SURGERY: ICD-10-CM

## 2019-12-16 PROCEDURE — 97110 THERAPEUTIC EXERCISES: CPT | Mod: GP | Performed by: PHYSICAL THERAPIST

## 2019-12-16 PROCEDURE — 97112 NEUROMUSCULAR REEDUCATION: CPT | Mod: GP | Performed by: PHYSICAL THERAPIST

## 2019-12-20 ENCOUNTER — THERAPY VISIT (OUTPATIENT)
Dept: PHYSICAL THERAPY | Facility: CLINIC | Age: 74
End: 2019-12-20
Payer: MEDICARE

## 2019-12-20 DIAGNOSIS — M25.561 ACUTE PAIN OF RIGHT KNEE: ICD-10-CM

## 2019-12-20 DIAGNOSIS — Z96.651 AFTERCARE FOLLOWING RIGHT KNEE JOINT REPLACEMENT SURGERY: ICD-10-CM

## 2019-12-20 DIAGNOSIS — Z47.1 AFTERCARE FOLLOWING RIGHT KNEE JOINT REPLACEMENT SURGERY: ICD-10-CM

## 2019-12-20 PROCEDURE — 97110 THERAPEUTIC EXERCISES: CPT | Mod: GP | Performed by: PHYSICAL THERAPIST

## 2019-12-20 PROCEDURE — 97112 NEUROMUSCULAR REEDUCATION: CPT | Mod: GP | Performed by: PHYSICAL THERAPIST

## 2019-12-23 ENCOUNTER — THERAPY VISIT (OUTPATIENT)
Dept: PHYSICAL THERAPY | Facility: CLINIC | Age: 74
End: 2019-12-23
Payer: MEDICARE

## 2019-12-23 DIAGNOSIS — M25.561 ACUTE PAIN OF RIGHT KNEE: ICD-10-CM

## 2019-12-23 DIAGNOSIS — Z47.1 AFTERCARE FOLLOWING RIGHT KNEE JOINT REPLACEMENT SURGERY: ICD-10-CM

## 2019-12-23 DIAGNOSIS — Z96.651 AFTERCARE FOLLOWING RIGHT KNEE JOINT REPLACEMENT SURGERY: ICD-10-CM

## 2019-12-23 PROCEDURE — 97110 THERAPEUTIC EXERCISES: CPT | Mod: GP | Performed by: PHYSICAL THERAPIST

## 2019-12-23 PROCEDURE — 97112 NEUROMUSCULAR REEDUCATION: CPT | Mod: GP | Performed by: PHYSICAL THERAPIST

## 2019-12-27 ENCOUNTER — THERAPY VISIT (OUTPATIENT)
Dept: PHYSICAL THERAPY | Facility: CLINIC | Age: 74
End: 2019-12-27
Payer: MEDICARE

## 2019-12-27 DIAGNOSIS — M25.561 ACUTE PAIN OF RIGHT KNEE: ICD-10-CM

## 2019-12-27 DIAGNOSIS — Z47.1 AFTERCARE FOLLOWING RIGHT KNEE JOINT REPLACEMENT SURGERY: ICD-10-CM

## 2019-12-27 DIAGNOSIS — Z96.651 AFTERCARE FOLLOWING RIGHT KNEE JOINT REPLACEMENT SURGERY: ICD-10-CM

## 2019-12-27 PROCEDURE — 97110 THERAPEUTIC EXERCISES: CPT | Mod: GP | Performed by: PHYSICAL THERAPIST

## 2019-12-27 PROCEDURE — 97112 NEUROMUSCULAR REEDUCATION: CPT | Mod: GP | Performed by: PHYSICAL THERAPIST

## 2019-12-30 ENCOUNTER — THERAPY VISIT (OUTPATIENT)
Dept: PHYSICAL THERAPY | Facility: CLINIC | Age: 74
End: 2019-12-30
Payer: MEDICARE

## 2019-12-30 DIAGNOSIS — Z47.1 AFTERCARE FOLLOWING RIGHT KNEE JOINT REPLACEMENT SURGERY: ICD-10-CM

## 2019-12-30 DIAGNOSIS — Z96.651 AFTERCARE FOLLOWING RIGHT KNEE JOINT REPLACEMENT SURGERY: ICD-10-CM

## 2019-12-30 DIAGNOSIS — M25.561 ACUTE PAIN OF RIGHT KNEE: ICD-10-CM

## 2019-12-30 PROCEDURE — 97110 THERAPEUTIC EXERCISES: CPT | Mod: GP | Performed by: PHYSICAL THERAPIST

## 2019-12-30 PROCEDURE — 97112 NEUROMUSCULAR REEDUCATION: CPT | Mod: GP | Performed by: PHYSICAL THERAPIST

## 2020-01-03 ENCOUNTER — THERAPY VISIT (OUTPATIENT)
Dept: PHYSICAL THERAPY | Facility: CLINIC | Age: 75
End: 2020-01-03
Payer: MEDICARE

## 2020-01-03 DIAGNOSIS — Z96.651 AFTERCARE FOLLOWING RIGHT KNEE JOINT REPLACEMENT SURGERY: ICD-10-CM

## 2020-01-03 DIAGNOSIS — M25.561 ACUTE PAIN OF RIGHT KNEE: ICD-10-CM

## 2020-01-03 DIAGNOSIS — Z47.1 AFTERCARE FOLLOWING RIGHT KNEE JOINT REPLACEMENT SURGERY: ICD-10-CM

## 2020-01-03 PROCEDURE — 97112 NEUROMUSCULAR REEDUCATION: CPT | Mod: GP | Performed by: PHYSICAL THERAPIST

## 2020-01-03 PROCEDURE — 97110 THERAPEUTIC EXERCISES: CPT | Mod: GP | Performed by: PHYSICAL THERAPIST

## 2020-01-06 ENCOUNTER — THERAPY VISIT (OUTPATIENT)
Dept: PHYSICAL THERAPY | Facility: CLINIC | Age: 75
End: 2020-01-06
Payer: MEDICARE

## 2020-01-06 DIAGNOSIS — Z96.651 AFTERCARE FOLLOWING RIGHT KNEE JOINT REPLACEMENT SURGERY: ICD-10-CM

## 2020-01-06 DIAGNOSIS — M25.561 ACUTE PAIN OF RIGHT KNEE: ICD-10-CM

## 2020-01-06 DIAGNOSIS — Z47.1 AFTERCARE FOLLOWING RIGHT KNEE JOINT REPLACEMENT SURGERY: ICD-10-CM

## 2020-01-06 PROCEDURE — 97110 THERAPEUTIC EXERCISES: CPT | Mod: GP | Performed by: PHYSICAL THERAPIST

## 2020-01-06 PROCEDURE — 97112 NEUROMUSCULAR REEDUCATION: CPT | Mod: GP | Performed by: PHYSICAL THERAPIST

## 2020-01-10 ENCOUNTER — THERAPY VISIT (OUTPATIENT)
Dept: PHYSICAL THERAPY | Facility: CLINIC | Age: 75
End: 2020-01-10
Payer: MEDICARE

## 2020-01-10 DIAGNOSIS — Z47.1 AFTERCARE FOLLOWING RIGHT KNEE JOINT REPLACEMENT SURGERY: ICD-10-CM

## 2020-01-10 DIAGNOSIS — Z96.651 AFTERCARE FOLLOWING RIGHT KNEE JOINT REPLACEMENT SURGERY: ICD-10-CM

## 2020-01-10 DIAGNOSIS — M25.561 ACUTE PAIN OF RIGHT KNEE: ICD-10-CM

## 2020-01-10 PROCEDURE — 97110 THERAPEUTIC EXERCISES: CPT | Mod: GP | Performed by: PHYSICAL THERAPIST

## 2020-01-10 PROCEDURE — 97112 NEUROMUSCULAR REEDUCATION: CPT | Mod: GP | Performed by: PHYSICAL THERAPIST

## 2020-01-13 ENCOUNTER — THERAPY VISIT (OUTPATIENT)
Dept: PHYSICAL THERAPY | Facility: CLINIC | Age: 75
End: 2020-01-13
Payer: MEDICARE

## 2020-01-13 DIAGNOSIS — Z96.651 AFTERCARE FOLLOWING RIGHT KNEE JOINT REPLACEMENT SURGERY: ICD-10-CM

## 2020-01-13 DIAGNOSIS — M25.561 ACUTE PAIN OF RIGHT KNEE: ICD-10-CM

## 2020-01-13 DIAGNOSIS — Z47.1 AFTERCARE FOLLOWING RIGHT KNEE JOINT REPLACEMENT SURGERY: ICD-10-CM

## 2020-01-13 PROCEDURE — 97112 NEUROMUSCULAR REEDUCATION: CPT | Mod: GP | Performed by: PHYSICAL THERAPIST

## 2020-01-13 PROCEDURE — 97110 THERAPEUTIC EXERCISES: CPT | Mod: GP | Performed by: PHYSICAL THERAPIST

## 2020-01-13 NOTE — PROGRESS NOTES
PROGRESS  REPORT    Progress reporting period is from eval to 1/13/20.       SUBJECTIVE  Subjective changes noted by patient:  .  Subjective: Toleratring exercises well.  Walking more with less difficulty.    Current pain level is  Current Pain level: 0/10.     Previous pain level was   Initial Pain level: 5/10.   Changes in function:  Yes (See Goal flowsheet attached for changes in current functional level)  Adverse reaction to treatment or activity: None    OBJECTIVE  Changes noted in objective findings:  Yes,   Objective: ROM:   3-0-130.  Strength 4/5 generally on R.       ASSESSMENT/PLAN  Updated problem list and treatment plan: Diagnosis 1:  S/p R TKA  Decreased strength - therapeutic exercise and therapeutic activities  Impaired muscle performance - neuro re-education  Decreased function - therapeutic activities  STG/LTGs have been met or progress has been made towards goals:  Yes (See Goal flow sheet completed today.)  Assessment of Progress: The patient's condition is improving.  The patient's condition has potential to improve.  The patient has met all of their long term goals.  Self Management Plans:  Patient has been instructed in a home treatment program.  Patient is independent in a home treatment program.  I have re-evaluated this patient and find that the nature, scope, duration and intensity of the therapy is appropriate for the medical condition of the patient.      Recommendations:  This patient would benefit from further evaluation.    Please refer to the daily flowsheet for treatment today, total treatment time and time spent performing 1:1 timed codes.

## 2020-01-13 NOTE — LETTER
[facility] ATHLETIC [facility] PHYSICAL THERAPY  [address]  [facility] [address]  [phone]    2020    Re: Susanne Escoto   :   1945  MRN:  [id number]   REFERRING PHYSICIAN:   Gopal Snowden    [facility] PHYSICAL [facility]    Date of Initial Evaluation:2019  Visits:  Rxs Used: 15  Reason for Referral:     Aftercare following right knee joint replacement surgery  Acute pain of right knee    EVALUATION SUMMARY    PROGRESS  REPORT  Progress reporting period is from eval to 20.       SUBJECTIVE  Subjective changes noted by patient:  .  Subjective: Toleratring exercises well.  Walking more with less difficulty.    Current pain level is  Current Pain level: 0/10.     Previous pain level was   Initial Pain level: 5/10.   Changes in function:  Yes (See Goal flowsheet attached for changes in current functional level)  Adverse reaction to treatment or activity: None    OBJECTIVE  Changes noted in objective findings:  Yes,   Objective: ROM:   3-0-130.  Strength 4/5 generally on R.       ASSESSMENT/PLAN  Updated problem list and treatment plan: Diagnosis 1:  S/p R TKA  Decreased strength - therapeutic exercise and therapeutic activities  Impaired muscle performance - neuro re-education  Decreased function - therapeutic activities  STG/LTGs have been met or progress has been made towards goals:  Yes (See Goal flow sheet completed today.)  Assessment of Progress: The patient's condition is improving.  The patient's condition has potential to improve.  The patient has met all of their long term goals.  Self Management Plans:  Patient has been instructed in a home treatment program.  Patient is independent in a home treatment program.  I have re-evaluated this patient and find that the nature, scope, duration and intensity of the therapy is appropriate for the medical condition of the patient.        Re: Susanne CARRASCO  Jevon   :   1945  MRN:  [id number]       Recommendations:  This patient would benefit from further evaluation.    Thank you for your referral.    INQUIRIES  Therapist: Jude Fritz   [facility] [place] [facility]  [address] [address]  Phone: [phone]  Fax: [phone]

## 2020-02-20 PROBLEM — M25.561 ACUTE PAIN OF RIGHT KNEE: Status: RESOLVED | Noted: 2019-11-25 | Resolved: 2020-02-20

## 2020-02-20 PROBLEM — Z96.651 AFTERCARE FOLLOWING RIGHT KNEE JOINT REPLACEMENT SURGERY: Status: RESOLVED | Noted: 2019-11-25 | Resolved: 2020-02-20

## 2020-02-20 PROBLEM — Z47.1 AFTERCARE FOLLOWING RIGHT KNEE JOINT REPLACEMENT SURGERY: Status: RESOLVED | Noted: 2019-11-25 | Resolved: 2020-02-20

## 2022-06-01 DIAGNOSIS — Z11.59 ENCOUNTER FOR SCREENING FOR OTHER VIRAL DISEASES: Primary | ICD-10-CM

## 2022-06-07 ENCOUNTER — LAB (OUTPATIENT)
Dept: LAB | Facility: CLINIC | Age: 77
End: 2022-06-07
Attending: ORTHOPAEDIC SURGERY
Payer: MEDICARE

## 2022-06-07 DIAGNOSIS — Z11.59 ENCOUNTER FOR SCREENING FOR OTHER VIRAL DISEASES: ICD-10-CM

## 2022-06-07 PROCEDURE — U0003 INFECTIOUS AGENT DETECTION BY NUCLEIC ACID (DNA OR RNA); SEVERE ACUTE RESPIRATORY SYNDROME CORONAVIRUS 2 (SARS-COV-2) (CORONAVIRUS DISEASE [COVID-19]), AMPLIFIED PROBE TECHNIQUE, MAKING USE OF HIGH THROUGHPUT TECHNOLOGIES AS DESCRIBED BY CMS-2020-01-R: HCPCS

## 2022-06-07 PROCEDURE — U0005 INFEC AGEN DETEC AMPLI PROBE: HCPCS

## 2022-06-07 NOTE — PROGRESS NOTES
"  Pre-op Total Joint Patient Screening    1. Do you have a ride available to come to the hospital the day after your surgery by 8am with anticipated discharge of 11am? Y   2. What is the name of this person? Payne (daughter)  3. Do you have a  set up after surgery? Y  4. Will your  be the same person that gives you a ride home after surgery? Coaches: Kerry (daughter) + Jammie Malik\" (daughter)  5. Have you received the Joint Replacement Guidebook? Y  6. Do you have any questions about your guidebook? N  7. Have you activated your [employer] account? N-pt hasn't received invitation yet  8. Have you signed up for MY Chart access? N  "

## 2022-06-08 ENCOUNTER — ANESTHESIA EVENT (OUTPATIENT)
Dept: SURGERY | Facility: CLINIC | Age: 77
End: 2022-06-08
Payer: MEDICARE

## 2022-06-08 LAB — SARS-COV-2 RNA RESP QL NAA+PROBE: NEGATIVE

## 2022-06-08 RX ORDER — SENNOSIDES 8.6 MG
650 CAPSULE ORAL EVERY 8 HOURS PRN
Status: ON HOLD | COMMUNITY
End: 2022-06-10

## 2022-06-08 RX ORDER — CHLORTHALIDONE 25 MG/1
12.5 TABLET ORAL DAILY
Status: ON HOLD | COMMUNITY
End: 2022-06-10

## 2022-06-08 RX ORDER — ATORVASTATIN CALCIUM 10 MG/1
10 TABLET, FILM COATED ORAL EVERY EVENING
COMMUNITY

## 2022-06-08 NOTE — PROGRESS NOTES
PTA medications updated by Medication Scribe prior to surgery via phone call with patient (last doses completed by Nurse)     Medication history sources: Patient, H&P and Patient's home med list  In the past week, patient estimated taking medication this percent of the time: Greater than 90%  Adherence assessment: N/A Not Observed    Significant changes made to the medication list:  Patient reports no longer taking the following meds (med scribe removed from PTA med list): Amlodipine, Celecoxib, Hydrochlorothiazide, Macrobid, Oxycodone, Senna-Docusate, Turmeric, Nutritional Supplement      Additional medication history information:   Patient was advised to bring: Theratears Eye Drops    Medication reconciliation completed by provider prior to medication history? No    Time spent in this activity: 45 minutes    The information provided in this note is only as accurate as the sources available at the time of update(s)    Prior to Admission medications    Medication Sig Last Dose Taking? Auth Provider   acetaminophen (TYLENOL) 650 MG CR tablet Take 650 mg by mouth every 8 hours as needed for mild pain or fever  at prn Yes Reported, Patient   atorvastatin (LIPITOR) 10 MG tablet Take 10 mg by mouth every evening 6/8/2022 at pm Yes Reported, Patient   Calcium Carb-Cholecalciferol (CALCIUM + D3 PO) Take 1 tablet by mouth daily 6/1/2022 at am Yes Reported, Patient   Carboxymethylcellulose Sodium (THERATEARS OP) Place 1-2 drops into both eyes daily as needed  at prn Yes Reported, Patient   chlorthalidone (HYGROTON) 25 MG tablet Take 12.5 mg by mouth daily (0.5 x 25 mg = 12.5 mg) 6/8/2022 at am Yes Reported, Patient   famotidine (PEPCID) 20 MG tablet Take 20 mg by mouth daily 6/7/2022 at pm Yes Unknown, Entered By History   fish oil-omega-3 fatty acids 1000 MG capsule Take 1 g by mouth daily  6/1/2022 at am Yes Unknown, Entered By History   Glucosamine-Chondroitin (MOVE FREE PO) Take 1 tablet by mouth every morning 6/1/2022  at am Yes Reported, Patient   magnesium 250 MG tablet Take 1 tablet by mouth every evening  6/1/2022 at pm Yes Unknown, Entered By History   Melatonin 10 MG TABS tablet Take 10 mg by mouth nightly as needed for sleep 6/7/2022 at pm Yes Reported, Patient   multivitamin w/minerals (THERA-VIT-M) tablet Take 1 tablet by mouth every morning 6/1/2022 at am Yes Reported, Patient   Probiotic Product (PROBIOTIC-10 PO) Take 1 capsule by mouth every morning 6/1/2022 at am Yes Reported, Patient   vitamin D3 (CHOLECALCIFEROL) 1000 units (25 mcg) tablet Take 1,000 Units by mouth daily 6/1/2022 at am Yes Unknown, Entered By History   order for DME Equipment being ordered: Walker Wheels () and Walker ()  Treatment Diagnosis: impaired gait   Gopal Snowden MD   order for DME Equipment being ordered: Walker Wheels () and Walker ()  Treatment Diagnosis: Difficulty with gait   Gopal Snowden MD     Medication history completed by:    Malik Espinoza CPhT  [facility]ication [facility]

## 2022-06-09 ENCOUNTER — HOSPITAL ENCOUNTER (OUTPATIENT)
Facility: CLINIC | Age: 77
Discharge: HOME | End: 2022-06-10
Attending: ORTHOPAEDIC SURGERY | Admitting: ORTHOPAEDIC SURGERY
Payer: MEDICARE

## 2022-06-09 ENCOUNTER — APPOINTMENT (OUTPATIENT)
Dept: PHYSICAL THERAPY | Facility: CLINIC | Age: 77
End: 2022-06-09
Attending: ORTHOPAEDIC SURGERY
Payer: MEDICARE

## 2022-06-09 ENCOUNTER — APPOINTMENT (OUTPATIENT)
Dept: GENERAL RADIOLOGY | Facility: CLINIC | Age: 77
End: 2022-06-09
Attending: ORTHOPAEDIC SURGERY
Payer: MEDICARE

## 2022-06-09 ENCOUNTER — ANESTHESIA (OUTPATIENT)
Dept: SURGERY | Facility: CLINIC | Age: 77
End: 2022-06-09
Payer: MEDICARE

## 2022-06-09 DIAGNOSIS — I10 BENIGN ESSENTIAL HYPERTENSION: ICD-10-CM

## 2022-06-09 DIAGNOSIS — Z96.652 STATUS POST TOTAL KNEE REPLACEMENT, LEFT: Primary | ICD-10-CM

## 2022-06-09 LAB — POTASSIUM BLD-SCNC: 3.4 MMOL/L (ref 3.4–5.3)

## 2022-06-09 PROCEDURE — 258N000003 HC RX IP 258 OP 636: Performed by: ORTHOPAEDIC SURGERY

## 2022-06-09 PROCEDURE — 360N000077 HC SURGERY LEVEL 4, PER MIN: Performed by: ORTHOPAEDIC SURGERY

## 2022-06-09 PROCEDURE — 250N000011 HC RX IP 250 OP 636: Performed by: STUDENT IN AN ORGANIZED HEALTH CARE EDUCATION/TRAINING PROGRAM

## 2022-06-09 PROCEDURE — 250N000011 HC RX IP 250 OP 636: Performed by: NURSE ANESTHETIST, CERTIFIED REGISTERED

## 2022-06-09 PROCEDURE — 250N000013 HC RX MED GY IP 250 OP 250 PS 637: Performed by: STUDENT IN AN ORGANIZED HEALTH CARE EDUCATION/TRAINING PROGRAM

## 2022-06-09 PROCEDURE — C1776 JOINT DEVICE (IMPLANTABLE): HCPCS | Performed by: ORTHOPAEDIC SURGERY

## 2022-06-09 PROCEDURE — 97161 PT EVAL LOW COMPLEX 20 MIN: CPT | Mod: GP

## 2022-06-09 PROCEDURE — 250N000009 HC RX 250: Performed by: STUDENT IN AN ORGANIZED HEALTH CARE EDUCATION/TRAINING PROGRAM

## 2022-06-09 PROCEDURE — 250N000013 HC RX MED GY IP 250 OP 250 PS 637: Performed by: NURSE PRACTITIONER

## 2022-06-09 PROCEDURE — 250N000013 HC RX MED GY IP 250 OP 250 PS 637: Performed by: ORTHOPAEDIC SURGERY

## 2022-06-09 PROCEDURE — 258N000003 HC RX IP 258 OP 636: Performed by: NURSE ANESTHETIST, CERTIFIED REGISTERED

## 2022-06-09 PROCEDURE — 250N000009 HC RX 250: Performed by: ANESTHESIOLOGY

## 2022-06-09 PROCEDURE — 84132 ASSAY OF SERUM POTASSIUM: CPT | Performed by: ANESTHESIOLOGY

## 2022-06-09 PROCEDURE — C1713 ANCHOR/SCREW BN/BN,TIS/BN: HCPCS | Performed by: ORTHOPAEDIC SURGERY

## 2022-06-09 PROCEDURE — 710N000009 HC RECOVERY PHASE 1, LEVEL 1, PER MIN: Performed by: ORTHOPAEDIC SURGERY

## 2022-06-09 PROCEDURE — 999N000063 XR KNEE PORT LEFT 1/2 VIEWS: Mod: LT

## 2022-06-09 PROCEDURE — 999N000141 HC STATISTIC PRE-PROCEDURE NURSING ASSESSMENT: Performed by: ORTHOPAEDIC SURGERY

## 2022-06-09 PROCEDURE — 97110 THERAPEUTIC EXERCISES: CPT | Mod: GP

## 2022-06-09 PROCEDURE — 258N000003 HC RX IP 258 OP 636: Performed by: STUDENT IN AN ORGANIZED HEALTH CARE EDUCATION/TRAINING PROGRAM

## 2022-06-09 PROCEDURE — 258N000001 HC RX 258: Performed by: ORTHOPAEDIC SURGERY

## 2022-06-09 PROCEDURE — 272N000001 HC OR GENERAL SUPPLY STERILE: Performed by: ORTHOPAEDIC SURGERY

## 2022-06-09 PROCEDURE — 370N000017 HC ANESTHESIA TECHNICAL FEE, PER MIN: Performed by: ORTHOPAEDIC SURGERY

## 2022-06-09 PROCEDURE — 258N000003 HC RX IP 258 OP 636: Performed by: ANESTHESIOLOGY

## 2022-06-09 PROCEDURE — 250N000009 HC RX 250: Performed by: ORTHOPAEDIC SURGERY

## 2022-06-09 PROCEDURE — 250N000009 HC RX 250: Performed by: NURSE ANESTHETIST, CERTIFIED REGISTERED

## 2022-06-09 PROCEDURE — 36415 COLL VENOUS BLD VENIPUNCTURE: CPT | Performed by: ANESTHESIOLOGY

## 2022-06-09 PROCEDURE — 250N000011 HC RX IP 250 OP 636: Performed by: ORTHOPAEDIC SURGERY

## 2022-06-09 PROCEDURE — 97530 THERAPEUTIC ACTIVITIES: CPT | Mod: GP

## 2022-06-09 PROCEDURE — C1734 ORTH/DEVIC/DRUG BN/BN,TIS/BN: HCPCS | Performed by: ORTHOPAEDIC SURGERY

## 2022-06-09 DEVICE — PATELLA
Type: IMPLANTABLE DEVICE | Site: KNEE | Status: FUNCTIONAL
Brand: TRIATHLON

## 2022-06-09 DEVICE — CRUCIATE RETAINING FEMORAL
Type: IMPLANTABLE DEVICE | Site: KNEE | Status: FUNCTIONAL
Brand: TRIATHLON

## 2022-06-09 DEVICE — TIBIAL BEARING INSERT - CS
Type: IMPLANTABLE DEVICE | Site: KNEE | Status: FUNCTIONAL
Brand: TRIATHLON

## 2022-06-09 DEVICE — UNIVERSAL TIBIAL BASEPLATE
Type: IMPLANTABLE DEVICE | Site: KNEE | Status: FUNCTIONAL
Brand: TRIATHLON

## 2022-06-09 DEVICE — BONE CEMENT RADIOPAQUE SIMPLEX HV FULL DOSE 6194-1-001: Type: IMPLANTABLE DEVICE | Site: KNEE | Status: FUNCTIONAL

## 2022-06-09 RX ORDER — LIDOCAINE 40 MG/G
CREAM TOPICAL
Status: DISCONTINUED | OUTPATIENT
Start: 2022-06-09 | End: 2022-06-10 | Stop reason: HOSPADM

## 2022-06-09 RX ORDER — ONDANSETRON 2 MG/ML
INJECTION INTRAMUSCULAR; INTRAVENOUS PRN
Status: DISCONTINUED | OUTPATIENT
Start: 2022-06-09 | End: 2022-06-09

## 2022-06-09 RX ORDER — DIPHENHYDRAMINE HCL 12.5MG/5ML
12.5 LIQUID (ML) ORAL EVERY 6 HOURS PRN
Status: DISCONTINUED | OUTPATIENT
Start: 2022-06-09 | End: 2022-06-10 | Stop reason: HOSPADM

## 2022-06-09 RX ORDER — POLYETHYLENE GLYCOL 3350 17 G/17G
17 POWDER, FOR SOLUTION ORAL DAILY
Status: DISCONTINUED | OUTPATIENT
Start: 2022-06-10 | End: 2022-06-10 | Stop reason: HOSPADM

## 2022-06-09 RX ORDER — LIDOCAINE HYDROCHLORIDE 20 MG/ML
INJECTION, SOLUTION INFILTRATION; PERINEURAL PRN
Status: DISCONTINUED | OUTPATIENT
Start: 2022-06-09 | End: 2022-06-09

## 2022-06-09 RX ORDER — FENTANYL CITRATE 0.05 MG/ML
25 INJECTION, SOLUTION INTRAMUSCULAR; INTRAVENOUS EVERY 5 MIN PRN
Status: DISCONTINUED | OUTPATIENT
Start: 2022-06-09 | End: 2022-06-09 | Stop reason: HOSPADM

## 2022-06-09 RX ORDER — HYDROMORPHONE HCL IN WATER/PF 6 MG/30 ML
0.2 PATIENT CONTROLLED ANALGESIA SYRINGE INTRAVENOUS
Status: DISCONTINUED | OUTPATIENT
Start: 2022-06-09 | End: 2022-06-10 | Stop reason: HOSPADM

## 2022-06-09 RX ORDER — SODIUM CHLORIDE, SODIUM LACTATE, POTASSIUM CHLORIDE, CALCIUM CHLORIDE 600; 310; 30; 20 MG/100ML; MG/100ML; MG/100ML; MG/100ML
INJECTION, SOLUTION INTRAVENOUS CONTINUOUS
Status: DISCONTINUED | OUTPATIENT
Start: 2022-06-09 | End: 2022-06-09 | Stop reason: HOSPADM

## 2022-06-09 RX ORDER — PROPOFOL 10 MG/ML
INJECTION, EMULSION INTRAVENOUS CONTINUOUS PRN
Status: DISCONTINUED | OUTPATIENT
Start: 2022-06-09 | End: 2022-06-09

## 2022-06-09 RX ORDER — ONDANSETRON 4 MG/1
4 TABLET, ORALLY DISINTEGRATING ORAL EVERY 30 MIN PRN
Status: DISCONTINUED | OUTPATIENT
Start: 2022-06-09 | End: 2022-06-09 | Stop reason: HOSPADM

## 2022-06-09 RX ORDER — CEFAZOLIN SODIUM 2 G/100ML
2 INJECTION, SOLUTION INTRAVENOUS EVERY 8 HOURS
Status: COMPLETED | OUTPATIENT
Start: 2022-06-09 | End: 2022-06-10

## 2022-06-09 RX ORDER — ONDANSETRON 4 MG/1
4 TABLET, ORALLY DISINTEGRATING ORAL EVERY 6 HOURS PRN
Status: DISCONTINUED | OUTPATIENT
Start: 2022-06-09 | End: 2022-06-10 | Stop reason: HOSPADM

## 2022-06-09 RX ORDER — ACETAMINOPHEN 325 MG/1
975 TABLET ORAL ONCE
Status: COMPLETED | OUTPATIENT
Start: 2022-06-09 | End: 2022-06-09

## 2022-06-09 RX ORDER — MAGNESIUM HYDROXIDE 1200 MG/15ML
LIQUID ORAL PRN
Status: DISCONTINUED | OUTPATIENT
Start: 2022-06-09 | End: 2022-06-09 | Stop reason: HOSPADM

## 2022-06-09 RX ORDER — DEXMEDETOMIDINE HYDROCHLORIDE 4 UG/ML
INJECTION, SOLUTION INTRAVENOUS PRN
Status: DISCONTINUED | OUTPATIENT
Start: 2022-06-09 | End: 2022-06-09

## 2022-06-09 RX ORDER — SODIUM CHLORIDE, SODIUM LACTATE, POTASSIUM CHLORIDE, CALCIUM CHLORIDE 600; 310; 30; 20 MG/100ML; MG/100ML; MG/100ML; MG/100ML
INJECTION, SOLUTION INTRAVENOUS CONTINUOUS
Status: DISCONTINUED | OUTPATIENT
Start: 2022-06-09 | End: 2022-06-10 | Stop reason: HOSPADM

## 2022-06-09 RX ORDER — OXYCODONE HYDROCHLORIDE 5 MG/1
5 TABLET ORAL EVERY 4 HOURS PRN
Status: DISCONTINUED | OUTPATIENT
Start: 2022-06-09 | End: 2022-06-10 | Stop reason: HOSPADM

## 2022-06-09 RX ORDER — BISACODYL 10 MG
10 SUPPOSITORY, RECTAL RECTAL DAILY PRN
Status: DISCONTINUED | OUTPATIENT
Start: 2022-06-09 | End: 2022-06-10 | Stop reason: HOSPADM

## 2022-06-09 RX ORDER — HYDROMORPHONE HCL IN WATER/PF 6 MG/30 ML
0.4 PATIENT CONTROLLED ANALGESIA SYRINGE INTRAVENOUS
Status: DISCONTINUED | OUTPATIENT
Start: 2022-06-09 | End: 2022-06-10 | Stop reason: HOSPADM

## 2022-06-09 RX ORDER — CALCIUM CARBONATE 500 MG/1
500 TABLET, CHEWABLE ORAL 4 TIMES DAILY PRN
Status: DISCONTINUED | OUTPATIENT
Start: 2022-06-09 | End: 2022-06-10 | Stop reason: HOSPADM

## 2022-06-09 RX ORDER — ONDANSETRON 2 MG/ML
4 INJECTION INTRAMUSCULAR; INTRAVENOUS EVERY 30 MIN PRN
Status: DISCONTINUED | OUTPATIENT
Start: 2022-06-09 | End: 2022-06-09 | Stop reason: HOSPADM

## 2022-06-09 RX ORDER — NALOXONE HYDROCHLORIDE 0.4 MG/ML
0.2 INJECTION, SOLUTION INTRAMUSCULAR; INTRAVENOUS; SUBCUTANEOUS
Status: DISCONTINUED | OUTPATIENT
Start: 2022-06-09 | End: 2022-06-10 | Stop reason: HOSPADM

## 2022-06-09 RX ORDER — ONDANSETRON 2 MG/ML
4 INJECTION INTRAMUSCULAR; INTRAVENOUS EVERY 6 HOURS PRN
Status: DISCONTINUED | OUTPATIENT
Start: 2022-06-09 | End: 2022-06-10 | Stop reason: HOSPADM

## 2022-06-09 RX ORDER — TRANEXAMIC ACID 650 MG/1
1950 TABLET ORAL ONCE
Status: COMPLETED | OUTPATIENT
Start: 2022-06-09 | End: 2022-06-09

## 2022-06-09 RX ORDER — HYDROXYZINE HYDROCHLORIDE 10 MG/1
10 TABLET, FILM COATED ORAL EVERY 6 HOURS PRN
Status: DISCONTINUED | OUTPATIENT
Start: 2022-06-09 | End: 2022-06-10 | Stop reason: HOSPADM

## 2022-06-09 RX ORDER — ACETAMINOPHEN 325 MG/1
650 TABLET ORAL EVERY 4 HOURS PRN
Status: DISCONTINUED | OUTPATIENT
Start: 2022-06-12 | End: 2022-06-10 | Stop reason: HOSPADM

## 2022-06-09 RX ORDER — BUPIVACAINE HYDROCHLORIDE 7.5 MG/ML
INJECTION, SOLUTION INTRASPINAL PRN
Status: DISCONTINUED | OUTPATIENT
Start: 2022-06-09 | End: 2022-06-09

## 2022-06-09 RX ORDER — CEFAZOLIN SODIUM/WATER 2 G/20 ML
2 SYRINGE (ML) INTRAVENOUS
Status: COMPLETED | OUTPATIENT
Start: 2022-06-09 | End: 2022-06-09

## 2022-06-09 RX ORDER — FAMOTIDINE 20 MG/1
20 TABLET, FILM COATED ORAL DAILY
Status: DISCONTINUED | OUTPATIENT
Start: 2022-06-09 | End: 2022-06-10 | Stop reason: HOSPADM

## 2022-06-09 RX ORDER — CEFAZOLIN SODIUM/WATER 2 G/20 ML
2 SYRINGE (ML) INTRAVENOUS SEE ADMIN INSTRUCTIONS
Status: DISCONTINUED | OUTPATIENT
Start: 2022-06-09 | End: 2022-06-09 | Stop reason: HOSPADM

## 2022-06-09 RX ORDER — ACETAMINOPHEN 325 MG/1
650 TABLET ORAL EVERY 4 HOURS PRN
Qty: 100 TABLET | Refills: 0 | Status: SHIPPED | OUTPATIENT
Start: 2022-06-09

## 2022-06-09 RX ORDER — OXYCODONE HYDROCHLORIDE 5 MG/1
10 TABLET ORAL EVERY 4 HOURS PRN
Status: DISCONTINUED | OUTPATIENT
Start: 2022-06-09 | End: 2022-06-10 | Stop reason: HOSPADM

## 2022-06-09 RX ORDER — HYDROMORPHONE HCL IN WATER/PF 6 MG/30 ML
0.2 PATIENT CONTROLLED ANALGESIA SYRINGE INTRAVENOUS EVERY 5 MIN PRN
Status: DISCONTINUED | OUTPATIENT
Start: 2022-06-09 | End: 2022-06-09 | Stop reason: HOSPADM

## 2022-06-09 RX ORDER — BUPIVACAINE HYDROCHLORIDE 7.5 MG/ML
INJECTION, SOLUTION INTRASPINAL
Status: COMPLETED | OUTPATIENT
Start: 2022-06-09 | End: 2022-06-09

## 2022-06-09 RX ORDER — ACETAMINOPHEN 325 MG/1
975 TABLET ORAL EVERY 8 HOURS
Status: DISCONTINUED | OUTPATIENT
Start: 2022-06-09 | End: 2022-06-10 | Stop reason: HOSPADM

## 2022-06-09 RX ORDER — ATORVASTATIN CALCIUM 10 MG/1
10 TABLET, FILM COATED ORAL EVERY EVENING
Status: DISCONTINUED | OUTPATIENT
Start: 2022-06-09 | End: 2022-06-10 | Stop reason: HOSPADM

## 2022-06-09 RX ORDER — PROCHLORPERAZINE MALEATE 5 MG
5 TABLET ORAL EVERY 6 HOURS PRN
Status: DISCONTINUED | OUTPATIENT
Start: 2022-06-09 | End: 2022-06-10 | Stop reason: HOSPADM

## 2022-06-09 RX ORDER — VANCOMYCIN HYDROCHLORIDE 1 G/20ML
INJECTION, POWDER, LYOPHILIZED, FOR SOLUTION INTRAVENOUS PRN
Status: DISCONTINUED | OUTPATIENT
Start: 2022-06-09 | End: 2022-06-09 | Stop reason: HOSPADM

## 2022-06-09 RX ORDER — METHOCARBAMOL 500 MG/1
500 TABLET, FILM COATED ORAL EVERY 6 HOURS PRN
Status: DISCONTINUED | OUTPATIENT
Start: 2022-06-09 | End: 2022-06-10 | Stop reason: HOSPADM

## 2022-06-09 RX ORDER — NALOXONE HYDROCHLORIDE 0.4 MG/ML
0.4 INJECTION, SOLUTION INTRAMUSCULAR; INTRAVENOUS; SUBCUTANEOUS
Status: DISCONTINUED | OUTPATIENT
Start: 2022-06-09 | End: 2022-06-10 | Stop reason: HOSPADM

## 2022-06-09 RX ORDER — LABETALOL HYDROCHLORIDE 5 MG/ML
10 INJECTION, SOLUTION INTRAVENOUS
Status: DISCONTINUED | OUTPATIENT
Start: 2022-06-09 | End: 2022-06-09 | Stop reason: HOSPADM

## 2022-06-09 RX ORDER — AMOXICILLIN 250 MG
1 CAPSULE ORAL 2 TIMES DAILY
Status: DISCONTINUED | OUTPATIENT
Start: 2022-06-09 | End: 2022-06-10 | Stop reason: HOSPADM

## 2022-06-09 RX ORDER — PREGABALIN 150 MG/1
150 CAPSULE ORAL ONCE
Status: COMPLETED | OUTPATIENT
Start: 2022-06-09 | End: 2022-06-09

## 2022-06-09 RX ORDER — AMOXICILLIN 250 MG
1-2 CAPSULE ORAL 2 TIMES DAILY
Qty: 30 TABLET | Refills: 0 | Status: SHIPPED | OUTPATIENT
Start: 2022-06-09

## 2022-06-09 RX ADMIN — TRANEXAMIC ACID 1950 MG: 650 TABLET ORAL at 09:32

## 2022-06-09 RX ADMIN — Medication 2 G: at 12:11

## 2022-06-09 RX ADMIN — SENNOSIDES AND DOCUSATE SODIUM 1 TABLET: 50; 8.6 TABLET ORAL at 20:25

## 2022-06-09 RX ADMIN — ONDANSETRON 4 MG: 2 INJECTION INTRAMUSCULAR; INTRAVENOUS at 14:02

## 2022-06-09 RX ADMIN — ACETAMINOPHEN 975 MG: 325 TABLET ORAL at 17:05

## 2022-06-09 RX ADMIN — PREGABALIN 150 MG: 150 CAPSULE ORAL at 09:32

## 2022-06-09 RX ADMIN — LIDOCAINE HYDROCHLORIDE 20 MG: 20 INJECTION, SOLUTION INFILTRATION; PERINEURAL at 12:13

## 2022-06-09 RX ADMIN — CALCIUM CARBONATE (ANTACID) CHEW TAB 500 MG 500 MG: 500 CHEW TAB at 22:06

## 2022-06-09 RX ADMIN — OXYCODONE HYDROCHLORIDE 5 MG: 5 TABLET ORAL at 17:06

## 2022-06-09 RX ADMIN — PHENYLEPHRINE HYDROCHLORIDE 150 MCG: 10 INJECTION INTRAVENOUS at 12:26

## 2022-06-09 RX ADMIN — PHENYLEPHRINE HYDROCHLORIDE 100 MCG: 10 INJECTION INTRAVENOUS at 13:58

## 2022-06-09 RX ADMIN — BUPIVACAINE HYDROCHLORIDE IN DEXTROSE 2 MG: 7.5 INJECTION, SOLUTION SUBARACHNOID at 12:11

## 2022-06-09 RX ADMIN — ATORVASTATIN CALCIUM 10 MG: 10 TABLET, FILM COATED ORAL at 20:25

## 2022-06-09 RX ADMIN — OXYCODONE HYDROCHLORIDE 5 MG: 5 TABLET ORAL at 22:03

## 2022-06-09 RX ADMIN — CEFAZOLIN SODIUM 2 G: 2 INJECTION, SOLUTION INTRAVENOUS at 20:15

## 2022-06-09 RX ADMIN — PROPOFOL 75 MCG/KG/MIN: 10 INJECTION, EMULSION INTRAVENOUS at 12:13

## 2022-06-09 RX ADMIN — ROPIVACAINE HYDROCHLORIDE 15 ML: 5 INJECTION, SOLUTION EPIDURAL; INFILTRATION; PERINEURAL at 10:40

## 2022-06-09 RX ADMIN — PHENYLEPHRINE HYDROCHLORIDE 0.5 MCG/KG/MIN: 10 INJECTION INTRAVENOUS at 12:31

## 2022-06-09 RX ADMIN — FAMOTIDINE 20 MG: 20 TABLET ORAL at 20:25

## 2022-06-09 RX ADMIN — SODIUM CHLORIDE, POTASSIUM CHLORIDE, SODIUM LACTATE AND CALCIUM CHLORIDE: 600; 310; 30; 20 INJECTION, SOLUTION INTRAVENOUS at 13:27

## 2022-06-09 RX ADMIN — BUPIVACAINE HYDROCHLORIDE 1.6 ML: 7.5 INJECTION, SOLUTION INTRASPINAL at 12:14

## 2022-06-09 RX ADMIN — PHENYLEPHRINE HYDROCHLORIDE 100 MCG: 10 INJECTION INTRAVENOUS at 12:20

## 2022-06-09 RX ADMIN — SODIUM CHLORIDE, POTASSIUM CHLORIDE, SODIUM LACTATE AND CALCIUM CHLORIDE: 600; 310; 30; 20 INJECTION, SOLUTION INTRAVENOUS at 10:07

## 2022-06-09 RX ADMIN — ACETAMINOPHEN 975 MG: 325 TABLET ORAL at 09:31

## 2022-06-09 RX ADMIN — SODIUM CHLORIDE, POTASSIUM CHLORIDE, SODIUM LACTATE AND CALCIUM CHLORIDE: 600; 310; 30; 20 INJECTION, SOLUTION INTRAVENOUS at 17:08

## 2022-06-09 RX ADMIN — DEXMEDETOMIDINE HYDROCHLORIDE 8 MCG: 200 INJECTION INTRAVENOUS at 12:12

## 2022-06-09 ASSESSMENT — ACTIVITIES OF DAILY LIVING (ADL)
DRESSING/BATHING_DIFFICULTY: NO
DOING_ERRANDS_INDEPENDENTLY_DIFFICULTY: NO
TOILETING_ISSUES: NO
CONCENTRATING,_REMEMBERING_OR_MAKING_DECISIONS_DIFFICULTY: NO
FALL_HISTORY_WITHIN_LAST_SIX_MONTHS: NO
CHANGE_IN_FUNCTIONAL_STATUS_SINCE_ONSET_OF_CURRENT_ILLNESS/INJURY: NO
WEAR_GLASSES_OR_BLIND: YES
DIFFICULTY_EATING/SWALLOWING: NO
WALKING_OR_CLIMBING_STAIRS_DIFFICULTY: NO
EQUIPMENT_CURRENTLY_USED_AT_HOME: WALKER, STANDARD

## 2022-06-09 ASSESSMENT — LIFESTYLE VARIABLES: TOBACCO_USE: 1

## 2022-06-09 NOTE — PROGRESS NOTES
Right pupil larger than left. 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

## 2022-06-09 NOTE — INTERVAL H&P NOTE
"I have reviewed the surgical (or preoperative) H&P that is linked to this encounter, and examined the patient. There are no significant changes    Clinical Conditions Present on Arrival:  Clinically Significant Risk Factors Present on Admission                   # Obesity: Estimated body mass index is 35.78 kg/m  as calculated from the following:    Height as of this encounter: 1.6 m (5' 3\").    Weight as of this encounter: 91.6 kg (202 lb).       "

## 2022-06-09 NOTE — ANESTHESIA CARE TRANSFER NOTE
Patient: Susanne Escoto    Procedure: Procedure(s):  LEFT TOTAL KNEE ARTHROPLASTY       Diagnosis: Primary osteoarthritis of left knee [M17.12]  Diagnosis Additional Information: No value filed.    Anesthesia Type:   Spinal     Note:    Oropharynx: oropharynx clear of all foreign objects and spontaneously breathing  Level of Consciousness: awake  Oxygen Supplementation: face mask  Level of Supplemental Oxygen (L/min / FiO2): 6  Independent Airway: airway patency satisfactory and stable  Dentition: dentition unchanged  Vital Signs Stable: post-procedure vital signs reviewed and stable  Report to RN Given: handoff report given  Patient transferred to: PACU    Handoff Report: Identifed the Patient, Identified the Reponsible Provider, Reviewed the pertinent medical history, Discussed the surgical course, Reviewed Intra-OP anesthesia mangement and issues during anesthesia, Set expectations for post-procedure period and Allowed opportunity for questions and acknowledgement of understanding      Vitals:  Vitals Value Taken Time   BP 90/56 06/09/22 1437   Temp     Pulse 81 06/09/22 1439   Resp 12 06/09/22 1439   SpO2 98 % 06/09/22 1439   Vitals shown include unvalidated device data.    Electronically Signed By: JOSE RAFAEL Roldan CRNA  June 9, 2022  2:40 PM

## 2022-06-09 NOTE — BRIEF OP NOTE
[facility]    Brief Operative Note    Pre-operative diagnosis: Left knee OA  Post-operative diagnosis same  Procedure: LEFT TOTAL KNEE ARTHROPLASTY  Surgeon(s) and Role:     * Gopal Snowden MD - Primary     * Melanie Saez PA-C - Assisting     * KRZYSZTOF Rod, KIRAN-C - Assisting  Anesthesia: spinal   Estimated blood loss: 100 ml  Drains:  None  Specimens: None  Findings:  Advanced OA  Complications: None    Plan: DC home POD1 w/family assist.  DVT prophylaxis w/ASA 325mg QD x6wks.    Implants:   Implant Name Type Inv. Item Serial No.  Lot No. LRB No. Used Action   BONE CEMENT RADIOPAQUE SIMPLEX HV FULL DOSE 6194-1-001 - [id number] Cement, Bone BONE CEMENT RADIOPAQUE SIMPLEX HV FULL DOSE 6194-1-001  [employer] [id number] Left 2 Implanted   IMP COMP PATELLA HOWM ASYM TRI [id number] 5551-L-320 - [id number] Total Joint Component/Insert IMP COMP PATELLA HOWM ASYM TRI [id number] 5551-L-320  [employer] [id number] Left 1 Implanted   IMP COMP FEM STRK TRIATHLN CR LT 3 5510-F-301 - [id number] Total Joint Component/Insert IMP COMP FEM STRK TRIATHLN CR LT 3 5510-F-301  [employer] [id number] Left 1 Implanted   IMP INSERT BASEPLATE TIBIAL STRK TRI 2 5521-B-200 - [id number] Total Joint Component/Insert IMP INSERT BASEPLATE TIBIAL STRK TRI 2 5521-B-200  [employer] [id number] Left 1 Implanted   INSERT TIB 2 10MM KN X3 CNDRL STAB BRNG TRTHLN STRL LF - [id number] Total Joint Component/Insert INSERT TIB 2 10MM KN X3 CNDRL STAB BRNG TRTHLN STRL LF  [employer]ANI[employer] [id number] Left 1 Implanted

## 2022-06-09 NOTE — ANESTHESIA PREPROCEDURE EVALUATION
Anesthesia Pre-Procedure Evaluation    Patient: Susanne Escoto   MRN: [id number] : 1945        Procedure : Procedure(s):  LEFT TOTAL KNEE ARTHROPLASTY          Past Medical History:   Diagnosis Date     Chronic kidney disease      Chronic knee pain      Contact dermatitis      GERD (gastroesophageal reflux disease)      Hyperlipidemia      Hypertension      Hypokalemia      Hyponatremia      Lung cancer (H)      Menopause      Osteoarthritis      Twitching      UTI (urinary tract infection)       Past Surgical History:   Procedure Laterality Date     ARTHROPLASTY KNEE Right 2019    Procedure: RIGHT TOTAL KNEE ARTHROPLASTY;  Surgeon: Gopal Snowden MD;  Location: [facility] OR     AS ESOPHAGOSCOPY, DIAGNOSTIC       LUNG SURGERY       ORTHOPEDIC SURGERY      Right thumb cyst removal     THORACOTOMY        No Known Allergies   Social History     Tobacco Use     Smoking status: Former Smoker     Packs/day: 0.50     Years: 30.00     Pack years: 15.00     Quit date: 1993     Years since quittin.4     Smokeless tobacco: Never Used   Substance Use Topics     Alcohol use: Yes     Comment: 1/week      Wt Readings from Last 1 Encounters:   22 91.6 kg (202 lb)        Anesthesia Evaluation            ROS/MED HX  ENT/Pulmonary:     (+) tobacco use, Past use,  (-) sleep apnea   Neurologic:       Cardiovascular:     (+) Dyslipidemia hypertension-----    METS/Exercise Tolerance:     Hematologic:       Musculoskeletal:   (+) arthritis,     GI/Hepatic:     (+) GERD,     Renal/Genitourinary:     (+) renal disease, type: CRI,     Endo:     (+) Obesity,     Psychiatric/Substance Use:       Infectious Disease:       Malignancy:   (+) Malignancy, History of Lung.    Other:            Physical Exam    Airway        Mallampati: II   TM distance: > 3 FB   Neck ROM: full   Mouth opening: > 3 cm    Respiratory Devices and Support         Dental  no notable dental history         Cardiovascular   cardiovascular  exam normal          Pulmonary   pulmonary exam normal                OUTSIDE LABS:  CBC:   Lab Results   Component Value Date    WBC 13.6 (H) 07/27/2019    WBC 6.4 07/10/2006    HGB 11.5 (L) 11/22/2019    HGB 13.6 11/21/2019    HCT 39.0 07/27/2019    HCT 40.5 07/10/2006     07/27/2019     07/10/2006     BMP:   Lab Results   Component Value Date     07/28/2019     07/27/2019    POTASSIUM 3.4 06/09/2022    POTASSIUM 4.2 11/21/2019    CHLORIDE 106 07/28/2019    CHLORIDE 101 07/27/2019    CO2 26 07/28/2019    CO2 26 07/27/2019    BUN 25 07/28/2019    BUN 31 (H) 07/27/2019    CR 1.22 (H) 11/21/2019    CR 1.16 (H) 07/28/2019     (H) 11/22/2019     (H) 07/28/2019     COAGS:   Lab Results   Component Value Date    PTT 28 07/10/2006    INR 0.89 07/10/2006     POC: No results found for: BGM, HCG, HCGS  HEPATIC: No results found for: ALBUMIN, PROTTOTAL, ALT, AST, GGT, ALKPHOS, BILITOTAL, BILIDIRECT, SHI  OTHER:   Lab Results   Component Value Date    PJ 8.3 (L) 07/28/2019       Anesthesia Plan    ASA Status:  3      Anesthesia Type: Spinal.              Consents    Anesthesia Plan(s) and associated risks, benefits, and realistic alternatives discussed. Questions answered and patient/representative(s) expressed understanding.    - Discussed:     - Discussed with:  Patient         Postoperative Care    Pain management: IV analgesics, Peripheral nerve block (Single Shot).   PONV prophylaxis: Ondansetron (or other 5HT-3)     Comments:                STUART REYES MD

## 2022-06-09 NOTE — ANESTHESIA PROCEDURE NOTES
Adductor canal Procedure Note    Pre-Procedure   Staff -        Anesthesiologist:  Hu Harris MD       Performed By: Anesthesiologist       Location: pre-op       Pre-Anesthestic Checklist: patient identified, IV checked, site marked, risks and benefits discussed, informed consent, monitors and equipment checked, at physician/surgeon's request and post-op pain management  Timeout:       Correct Patient: Yes        Correct Procedure: Yes        Correct Site: Yes        Correct Position: Yes        Correct Laterality: Yes        Site Marked: Yes  Procedure Documentation  Procedure: Adductor canal       Laterality: left       Patient Position: supine       Patient Prep/Sterile Barriers: sterile gloves, mask       Skin prep: Chloraprep       Local skin infiltrated with mL of 1% lidocaine.        Needle Type: insulated and short bevel (Arrow)       Needle Gauge: 21.        Needle Length (millimeters): 90        Ultrasound guided       1. Ultrasound was used to identify targeted nerve, plexus, vascular marker, or fascial plane and place a needle adjacent to it in real-time.       2. Ultrasound was used to visualize the spread of anesthetic in close proximity to the above referenced structure.       3. A permanent image is entered into the patient's record.       4. The visualized anatomic structures appeared normal.       5. There were no apparent abnormal pathologic findings.    Assessment/Narrative         The placement was negative for: blood aspirated, painful injection and site bleeding       Paresthesias: No.       Bolus given via needle..        Secured via.        Insertion/Infusion Method: Single Shot       Complications: none    Medication(s) Administered   Ropivacaine 0.5% w/ 1:400K Epi (Injection) - Injection   15 mL - 6/9/2022 10:40:00 AM   Comments:  Peripheral nerve block performed at request of the primary medical team.      Postoperative pain block requested by surgeon for severe postoperative pain.   Patient brought to Preop for procedure.      Pt tolerated well.    No complications.      The surgeon has given a verbal order transferring care of this patient to me for the performance of a regional analgesia block for post-op pain control. It is requested of me because I am uniquely trained and qualified to perform this block and the surgeon is neither trained nor qualified to perform this procedure.    STUART REYES MD   June 9, 2022 10:42 AM

## 2022-06-09 NOTE — PROGRESS NOTES
06/09/22 1700   Quick Adds   Type of Visit Initial PT Evaluation   Living Environment   People in Home alone   Current Living Arrangements house  (single level)   Home Accessibility stairs to enter home;stairs within home   Number of Stairs, Main Entrance 2   Stair Railings, Main Entrance railing on left side (ascending)   Transportation Anticipated family or friend will provide   Living Environment Comments daughter's will be staying with pt after surgery for several days. 2 KIMO, all needs met on first floor   Self-Care   Usual Activity Tolerance good   Current Activity Tolerance moderate   Equipment Currently Used at Home none   Fall history within last six months no   Activity/Exercise/Self-Care Comment pt was Ind with all mobility and ADL's prior to surgery   General Information   Onset of Illness/Injury or Date of Surgery 06/09/22   Referring Physician Gopal Snowden MD   Patient/Family Therapy Goals Statement (PT) return home with assist from daughter's   Pertinent History of Current Problem (include personal factors and/or comorbidities that impact the POC) Ms. Escoto is a 75 yo female with PMH significant for HTN, HLD GERD, CKD Stage 3 B, leg edema, history recurrent UTI's and microscopic hematuria, hx lung cancer s/p left upper lobectomy in 2014 who was admitted for elective left total knee arthroplasty on 6/9/2022, POD#0   Existing Precautions/Restrictions fall;weight bearing;other (see comments)  (ROM 0-flexion tolerance)   Weight-Bearing Status - LLE weight-bearing as tolerated   Cognition   Affect/Mental Status (Cognition) WFL;low arousal/lethargic   Orientation Status (Cognition) oriented x 4   Follows Commands (Cognition) WNL   Pain Assessment   Patient Currently in Pain   (5/10 left knee)   Integumentary/Edema   Integumentary/Edema Comments left knee covered in ace wrapping   Posture    Posture Forward head position   Range of Motion (ROM)   Range of Motion ROM deficits secondary to  surgical procedure;ROM deficits secondary to pain;ROM deficits secondary to swelling   ROM Comment deficits with left knee secondary to surgery with AROM ~0-90, otherwise appears grossly WFL   Strength (Manual Muscle Testing)   Strength (Manual Muscle Testing) Able to perform R SLR;Able to perform L SLR;Deficits observed during functional mobility   Strength Comments deficits with left knee and LLE, appears grossly antigravity   Bed Mobility   Comment, (Bed Mobility) supine>sit Ind   Transfers   Comment, (Transfers) sit<>stand with FWW CGA   Gait/Stairs (Locomotion)   Comment, (Gait/Stairs) pt too dizzy/nauseous to safely ambulate, pt able to march in place standing with FWW   Balance   Balance Comments sitting EOB unsupported, slightly unsteady standing in FWW   Sensory Examination   Sensory Perception patient reports no sensory changes   Clinical Impression   Criteria for Skilled Therapeutic Intervention Yes, treatment indicated   PT Diagnosis (PT) impaired mobility and gait   Influenced by the following impairments pain, deficits with functional ROM, strength, and balance   Functional limitations due to impairments reduced activity tolerance, reduced Indpendence with functional transfers, ambulation, and ADL's   Clinical Presentation (PT Evaluation Complexity) Stable/Uncomplicated   Clinical Presentation Rationale PMH and clinical judgement   Clinical Decision Making (Complexity) low complexity   Planned Therapy Interventions (PT) balance training;bed mobility training;cryotherapy;gait training;home exercise program;patient/family education;ROM (range of motion);stair training;strengthening;stretching   Anticipated Equipment Needs at Discharge (PT) walker, rolling   Risk & Benefits of therapy have been explained evaluation/treatment results reviewed;risks/benefits reviewed;care plan/treatment goals reviewed;current/potential barriers reviewed;participants voiced agreement with care plan;participants  included;patient;daughter   PT Discharge Planning   PT Discharge Recommendation (DC Rec)   (defer to ortho)   PT Rationale for DC Rec Pt is below baseline and was limited by dizziness and nausea tonight. Anticipate pt will improve so at discharge pt is Ind with bed mobility, SBA with functional transfers and ambulation using FWW, Maris with steps. Pt will have daughters saying wit her for assist first few days for 24/7   PT Brief overview of current status bed mobility Ind, sit<>stand with FWW CGA   Plan of Care Review   Plan of Care Reviewed With patient;daughter   Total Evaluation Time   Total Evaluation Time (Minutes) 10   Physical Therapy Goals   PT Frequency 2x/day   PT Predicted Duration/Target Date for Goal Attainment 06/10/22   PT Goals Bed Mobility;Transfers;Gait;Stairs   PT: Bed Mobility Independent;Supine to/from sit;Rolling;Bridging   PT: Transfers Supervision/stand-by assist;Sit to/from stand;Assistive device   PT: Gait Supervision/stand-by assist;Rolling walker;100 feet   PT: Stairs Minimal assist;2 stairs;Rail on left

## 2022-06-09 NOTE — ANESTHESIA PROCEDURE NOTES
Intrathecal Procedure Note    Pre-Procedure   Staff -        Anesthesiologist:  Hu Harris MD       Performed By: anesthesiologist       Location: OR       Pre-Anesthestic Checklist: patient identified, IV checked, risks and benefits discussed, informed consent, monitors and equipment checked and pre-op evaluation  Timeout:       Correct Patient: Yes        Correct Procedure: Yes        Correct Site: Yes        Correct Position: Yes   Procedure Documentation  Procedure: intrathecal       Patient Position: sitting       Skin prep: Betadine       Insertion Site: L4-5. (midline approach).       Needle Gauge: 24.        Needle Length (Inches): 3.5        Spinal Needle Type: Pencan       Introducer used       Introducer: 20 G       # of attempts: 1 and  # of redirects:     Assessment/Narrative         Paresthesias: No.       CSF fluid: clear.    Medication(s) Administered   0.75% Hyperbaric Bupivacaine (Intrathecal) - Intrathecal   1.6 mL - 6/9/2022 12:14:00 PM   Comments:  1% lidocaine for localization.  No complications.

## 2022-06-09 NOTE — PLAN OF CARE
A/Ox4. VSS on RA. Pain control with oxy and scheduled tylenol. Dressing to L knee C/D/I. CMS intact. Tolerating diet. Up Ax1 to BSC. Discharge pending.

## 2022-06-09 NOTE — ANESTHESIA POSTPROCEDURE EVALUATION
Patient: Susanne Escoto    Procedure: Procedure(s):  LEFT TOTAL KNEE ARTHROPLASTY       Anesthesia Type:  Spinal    Note:     Postop Pain Control: Uneventful            Sign Out: Well controlled pain   PONV: No   Neuro/Psych: Uneventful            Sign Out: Acceptable/Baseline neuro status   Airway/Respiratory: Uneventful            Sign Out: Acceptable/Baseline resp. status   CV/Hemodynamics: Uneventful            Sign Out: Acceptable CV status; No obvious hypovolemia; No obvious fluid overload   Other NRE: NONE   DID A NON-ROUTINE EVENT OCCUR? No           Last vitals:  Vitals Value Taken Time   BP 88/57 06/09/22 1510   Temp 36.4  C (97.5  F) 06/09/22 1500   Pulse 80 06/09/22 1511   Resp 17 06/09/22 1511   SpO2 96 % 06/09/22 1511   Vitals shown include unvalidated device data.    Electronically Signed By: Jammie Shahid MD, MD  June 9, 2022  3:11 PM

## 2022-06-09 NOTE — CONSULTS
Hospitalist Consult Note  6/9/2022  1:23 PM    Ms. Escoto is a 75 yo female with PMH significant for HTN, HLD GERD, CKD Stage 3 B, leg edema, history recurrent UTI's and microscopic hematuria, hx lung cancer s/p left upper lobectomy in 2014 who was admitted for elective left total knee arthroplasty. Uneventful operative course. Hospitalist was consulted post-op for medical management.    The patient was not seen. EMR was reviewed that included pre-op H&P and PTA medications, labs/diagnostics, notes and vitals. The patient is hemodynamically stable with vital signs within normal limits, pain is well managed, and care thus far is as expected. I have reviewed the H&P, reviewed and reconciled prior to admission medications. Given the above, will defer formal consult. Routine post-operative cares, IVF, DVT prophylaxis, and pain management to orthopedic service. Will check some basic lab work in the AM to assess for acute blood loss anemia given surgery. PT and OT to assess per Ortho. Bowel regimen in place while on pain regimen. No changes to PTA medication regimen at discharge unless issues arise throughout stay. Plan is outlined below.     POD #0 s/p Left TKA  Hemodynamically stable with BP's in the 100's over 60's, heart rates in the 70's and currently satting 98% on 3 liters nasal cannula.  -Post-op management as per Ortho  -Pain, activity, anticoagulation, BM regimen, therapies as per Ortho  -Wean oxygen to maintain sats >90%  -Pulmonary toileting post op--IS  -Hgb in AM    Hypertension  Chronic Systolic Heart Failure  Leg Edema  On chlorthalidone PTA with outpatient BP goal of <130/80 due to renal disease. LVEF of 40% with inferior WMA noted on prior echo.  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 No acute issues.    Hospitalist will follow peripherally and chart check in the AM to review renal function, blood pressures and restart anti-hypertensive med if appropriate. If medically stable after review on 6/10/22, will likely sign off. The Hospitalist service appreciates this consult.     Annabel Arnold NP  [facility]  Securely message with the Vocera Web Console (learn more here)  Text page via [employer] Paging/Directory

## 2022-06-10 ENCOUNTER — APPOINTMENT (OUTPATIENT)
Dept: PHYSICAL THERAPY | Facility: CLINIC | Age: 77
End: 2022-06-10
Attending: ORTHOPAEDIC SURGERY
Payer: MEDICARE

## 2022-06-10 ENCOUNTER — APPOINTMENT (OUTPATIENT)
Dept: OCCUPATIONAL THERAPY | Facility: CLINIC | Age: 77
End: 2022-06-10
Attending: ORTHOPAEDIC SURGERY
Payer: MEDICARE

## 2022-06-10 VITALS
WEIGHT: 202 LBS | HEART RATE: 70 BPM | RESPIRATION RATE: 16 BRPM | BODY MASS INDEX: 35.79 KG/M2 | TEMPERATURE: 97.8 F | SYSTOLIC BLOOD PRESSURE: 125 MMHG | HEIGHT: 63 IN | DIASTOLIC BLOOD PRESSURE: 70 MMHG | OXYGEN SATURATION: 95 %

## 2022-06-10 LAB
ANION GAP SERPL CALCULATED.3IONS-SCNC: 4 MMOL/L (ref 3–14)
BUN SERPL-MCNC: 18 MG/DL (ref 7–30)
CALCIUM SERPL-MCNC: 9.1 MG/DL (ref 8.5–10.1)
CHLORIDE BLD-SCNC: 99 MMOL/L (ref 94–109)
CO2 SERPL-SCNC: 31 MMOL/L (ref 20–32)
CREAT SERPL-MCNC: 1.02 MG/DL (ref 0.52–1.04)
FASTING STATUS PATIENT QL REPORTED: YES
GFR SERPL CREATININE-BSD FRML MDRD: 56 ML/MIN/1.73M2
GLUCOSE BLD-MCNC: 109 MG/DL (ref 70–99)
GLUCOSE BLD-MCNC: 109 MG/DL (ref 70–99)
HGB BLD-MCNC: 12.5 G/DL (ref 11.7–15.7)
POTASSIUM BLD-SCNC: 3.3 MMOL/L (ref 3.4–5.3)
SODIUM SERPL-SCNC: 134 MMOL/L (ref 133–144)

## 2022-06-10 PROCEDURE — 250N000013 HC RX MED GY IP 250 OP 250 PS 637: Performed by: ORTHOPAEDIC SURGERY

## 2022-06-10 PROCEDURE — 250N000011 HC RX IP 250 OP 636: Performed by: ORTHOPAEDIC SURGERY

## 2022-06-10 PROCEDURE — 85018 HEMOGLOBIN: CPT | Performed by: ORTHOPAEDIC SURGERY

## 2022-06-10 PROCEDURE — 36415 COLL VENOUS BLD VENIPUNCTURE: CPT | Performed by: PHYSICIAN ASSISTANT

## 2022-06-10 PROCEDURE — 80048 BASIC METABOLIC PNL TOTAL CA: CPT | Performed by: PHYSICIAN ASSISTANT

## 2022-06-10 PROCEDURE — 97116 GAIT TRAINING THERAPY: CPT | Mod: GP | Performed by: PHYSICAL THERAPIST

## 2022-06-10 PROCEDURE — 97535 SELF CARE MNGMENT TRAINING: CPT | Mod: GO

## 2022-06-10 PROCEDURE — 82947 ASSAY GLUCOSE BLOOD QUANT: CPT | Performed by: NURSE PRACTITIONER

## 2022-06-10 PROCEDURE — 97530 THERAPEUTIC ACTIVITIES: CPT | Mod: GP | Performed by: PHYSICAL THERAPIST

## 2022-06-10 PROCEDURE — 97165 OT EVAL LOW COMPLEX 30 MIN: CPT | Mod: GO

## 2022-06-10 PROCEDURE — 250N000013 HC RX MED GY IP 250 OP 250 PS 637: Performed by: PHYSICIAN ASSISTANT

## 2022-06-10 PROCEDURE — 97110 THERAPEUTIC EXERCISES: CPT | Mod: GP | Performed by: PHYSICAL THERAPIST

## 2022-06-10 PROCEDURE — 99207 PR NO BILLABLE SERVICE THIS VISIT: CPT | Performed by: PHYSICIAN ASSISTANT

## 2022-06-10 RX ORDER — POTASSIUM CHLORIDE 1.5 G/1.58G
20 POWDER, FOR SOLUTION ORAL 2 TIMES DAILY
Status: COMPLETED | OUTPATIENT
Start: 2022-06-10 | End: 2022-06-10

## 2022-06-10 RX ORDER — CHLORTHALIDONE 25 MG/1
12.5 TABLET ORAL DAILY
DISCHARGE
Start: 2022-06-10

## 2022-06-10 RX ORDER — OXYCODONE HYDROCHLORIDE 5 MG/1
5 TABLET ORAL EVERY 4 HOURS PRN
Qty: 33 TABLET | Refills: 0 | Status: ON HOLD | OUTPATIENT
Start: 2022-06-10 | End: 2022-07-02

## 2022-06-10 RX ADMIN — SENNOSIDES AND DOCUSATE SODIUM 1 TABLET: 50; 8.6 TABLET ORAL at 09:18

## 2022-06-10 RX ADMIN — OXYCODONE HYDROCHLORIDE 5 MG: 5 TABLET ORAL at 07:48

## 2022-06-10 RX ADMIN — ACETAMINOPHEN 975 MG: 325 TABLET ORAL at 09:17

## 2022-06-10 RX ADMIN — POTASSIUM CHLORIDE 20 MEQ: 1.5 POWDER, FOR SOLUTION ORAL at 12:03

## 2022-06-10 RX ADMIN — OXYCODONE HYDROCHLORIDE 5 MG: 5 TABLET ORAL at 12:05

## 2022-06-10 RX ADMIN — POTASSIUM CHLORIDE 20 MEQ: 1.5 POWDER, FOR SOLUTION ORAL at 09:18

## 2022-06-10 RX ADMIN — OXYCODONE HYDROCHLORIDE 5 MG: 5 TABLET ORAL at 03:30

## 2022-06-10 RX ADMIN — HYDROXYZINE HYDROCHLORIDE 10 MG: 10 TABLET ORAL at 07:48

## 2022-06-10 RX ADMIN — CALCIUM CARBONATE (ANTACID) CHEW TAB 500 MG 500 MG: 500 CHEW TAB at 12:05

## 2022-06-10 RX ADMIN — ASPIRIN 325 MG: 325 TABLET, COATED ORAL at 09:18

## 2022-06-10 RX ADMIN — CEFAZOLIN SODIUM 2 G: 2 INJECTION, SOLUTION INTRAVENOUS at 03:30

## 2022-06-10 NOTE — PROGRESS NOTES
Patient vital signs are at baseline: Yes  Patient able to ambulate as they were prior to admission or with assist devices provided by therapies during their stay:  Yes  Patient MUST void prior to discharge:  Yes  Patient able to tolerate oral intake:  Yes  Pain has adequate pain control using Oral analgesics:  Yes  Does patient have an identified :  Yes  Has goal D/C date and time been discussed with patient:  Yes     To continue to monitor.

## 2022-06-10 NOTE — PROGRESS NOTES
06/10/22 1022   Quick Adds   Type of Visit Initial Occupational Therapy Evaluation   Living Environment   People in Home alone   Current Living Arrangements house   Home Accessibility stairs to enter home;stairs within home   Number of Stairs, Main Entrance 2   Stair Railings, Main Entrance railing on left side (ascending)   Transportation Anticipated family or friend will provide   Living Environment Comments Daughters will stay with pt and are able to help pt upon return home. Son will come to town on Tuesday. Pt states that necessities are located on first floor. There is a tub shower on the main floor with a tub transfer bench. Pt states that she can use walk in shower downstairs once she feels up to it. There is a grab bar next to the toilet on the L side.   Self-Care   Usual Activity Tolerance good   Current Activity Tolerance moderate   Activity/Exercise/Self-Care Comment Ind prior to surgery   General Information   Onset of Illness/Injury or Date of Surgery 06/09/22   Referring Physician Gopal Snowden MD   Patient/Family Therapy Goal Statement (OT) To return home   Additional Occupational Profile Info/Pertinent History of Current Problem 75 yo female with PMH significant for HTN, HLD GERD, CKD3b, leg edema, history recurrent UTI's and microscopic hematuria, hx lung cancer s/p left upper lobectomy 2014 who was admitted for elective left total knee arthroplasty.   Existing Precautions/Restrictions weight bearing   Right Upper Extremity (Weight-bearing Status) full weight-bearing (FWB)   Left Lower Extremity (Weight-bearing Status) weight-bearing as tolerated (WBAT)   General Observations and Info Pt has had her other knee replaced, so she has accomodations in place at home.   Cognitive Status Examination   Orientation Status orientation to person, place and time   Visual Perception   Visual Impairment/Limitations corrective lenses full-time   Sensory   Sensory Comments Not tested at this time   Pain  Assessment   Patient Currently in Pain No   Integumentary/Edema   Integumentary/Edema Comments ACE wrap on LLE   Range of Motion Comprehensive   General Range of Motion bilateral upper extremity ROM WFL   Strength Comprehensive (MMT)   Comment, General Manual Muscle Testing (MMT) Assessment Not tested at this time   Coordination   Upper Extremity Coordination No deficits were identified   Transfers   Transfers sit-stand transfer;toilet transfer   Sit-Stand Transfer   Sit-Stand Bethany (Transfers) verbal cues;contact guard   Assistive Device (Sit-Stand Transfers) walker, front-wheeled   Toilet Transfer   Type (Toilet Transfer) stand-sit;sit-stand   Beth[place] Level (Toilet Transfer) verbal cues;supervision   Assistive Device (Toilet Transfer) grab bars/safety frame;walker, front-wheeled   Activities of Daily Living   BADL Assessment/Intervention upper body dressing;lower body dressing;grooming;toileting   Upper Body Dressing Assessment/Training   Position (Upper Body Dressing) edge of bed sitting   Beth[place] Level (Upper Body Dressing) set up;verbal cues;supervision   Lower Body Dressing Assessment/Training   Position (Lower Body Dressing) edge of bed sitting   Comment, (Lower Body Dressing) Pt requires assist to thread ankles through pant legs but states daughter can help at home   Beth[place] Level (Lower Body Dressing) set up;verbal cues;minimum assist (75% patient effort)   Grooming Assessment/Training   Position (Grooming) sink side;supported standing   [place] Level (Grooming) set up;verbal cues;contact guard assist   Comment, (Grooming) FWW in front of pt as she stands at sink   Toileting   Comment, (Toileting) Completed with use of FWW and grab bars. Pt reports grab bar on L side of toilet at home   Beth[place] Level (Toileting) supervision;verbal cues   Clinical Impression   Criteria for Skilled Therapeutic Interventions Met (OT) Yes, treatment indicated   OT Diagnosis Pt presents with  decreased funtional mobility in preparation for completing ADLs/IADLs   OT Problem List-Impairments impacting ADL problems related to;activity tolerance impaired;mobility;range of motion (ROM);pain   Assessment of Occupational Performance 1-3 Performance Deficits   Identified Performance Deficits LB dressing, toileting, grooming/hygiene   Planned Therapy Interventions (OT) ADL retraining;progressive activity/exercise;transfer training   Clinical Decision Making Complexity (OT) low complexity   Risk & Benefits of therapy have been explained evaluation/treatment results reviewed;care plan/treatment goals reviewed;risks/benefits reviewed;current/potential barriers reviewed;participants voiced agreement with care plan;participants included;patient;daughter   Clinical Impression Comments Pt has personal FWW that she will use upon departure   OT Discharge Planning   OT Rationale for DC Rec Pt ambulates well from bedside chair to bathroom using FWW to complete toileting and self care activities SBA. She is able to maintain balance while standing at the sink with FWW. Pt completes dressing SBA to Min A for pants management. Continued practice and reminders to not solely rely on FWW when transferring are needed. Upon return home, pt will have assist from children and will have all needs met on first floor until she feels comfortable ambulating to other areas of her home.   Total Evaluation Time (Minutes)   Total Evaluation Time (Minutes) 10   OT Goals   Therapy Frequency (OT) One time eval and treatment   OT Predicted Duration/Target Date for Goal Attainment 06/10/22   OT Goals Hygiene/Grooming;Upper Body Dressing;Lower Body Dressing;Toilet Transfer/Toileting   OT: Hygiene/Grooming supervision/stand-by assist   OT: Upper Body Dressing Supervision/stand-by assist   OT: Lower Body Dressing Minimal assist   OT: Toilet Transfer/Toileting Supervision/stand-by assist

## 2022-06-10 NOTE — PROGRESS NOTES
Orthopedic Surgery  Susanne Escoto  6/10/2022  Admit Date:  2022  POD 1 Day Post-Op  S/P Procedure(s):  LEFT TOTAL KNEE ARTHROPLASTY    Patient is feeling okay, daughter is present. Feeling lightheaded and having some vertigo. Pain controlled.  Tolerating oral intake.  No events overnight. Discussed discharge recs.    Alert and orient to person, place, and time.  Vital Sign Ranges  Temperature Temp  Av.5  F (36.4  C)  Min: 97  F (36.1  C)  Max: 98.2  F (36.8  C)   Blood pressure Systolic (24hrs), Av , Min:80 , Max:136        Diastolic (24hrs), Av, Min:54, Max:92      Pulse Pulse  Av.3  Min: 65  Max: 91   Respirations Resp  Av  Min: 10  Max: 21   Pulse oximetry SpO2  Av.7 %  Min: 94 %  Max: 99 %       Left knee post-operative dressing is clean, dry, and intact. Minimal erythema of the surrounding skin.  Bilateral calves are soft, non-tender.  Left lower extremity is NVI.    Labs:  Recent Labs   Lab Test 06/10/22  0800 22  0919 19  0732   POTASSIUM 3.3* 3.4 4.2     Recent Labs   Lab Test 06/10/22  0800 19  0636 19  0732   HGB 12.5 11.5* 13.6     No results for input(s): INR in the last 66413 hours.  Recent Labs   Lab Test 19  2154          A/P  1. S/p left TKA      Slow progress with therapy      Symptomatic low BPs - medicine following, may need to stay for second therapy session   Continue aspirin 325 qd for DVT prophylaxis.     Mobilize with PT/OT WBAT.     Continue current pain regimen - no NSAIDs.    2. Disposition   Anticipate d/c to home today    Kjerstin L Foss, PA-C

## 2022-06-10 NOTE — PLAN OF CARE
Occupational Therapy Discharge Summary    Reason for therapy discharge:    All goals and outcomes met, no further needs identified.    Progress towards therapy goal(s). See goals on Care Plan in [facility] electronic health record for goal details.  Goals met    Therapy recommendation(s):    No further therapy is recommended.

## 2022-06-10 NOTE — PROVIDER NOTIFICATION
MD Notification    Notified Person: MD    Notified Person Name:MD DENNIS Barakat     Notification Date/Time:06/10/22  09:03    Notification Interaction:web paged    Purpose of Notification:potassium 3.3, soft BP    Orders Received:potassium ordered.    Comments:monitor BP, hx of HF.

## 2022-06-10 NOTE — PLAN OF CARE
Physical Therapy Discharge Summary    Reason for therapy discharge:    Discharged to home with outpatient therapy.    Progress towards therapy goal(s). See goals on Care Plan in [facility] electronic health record for goal details.  Goals met    Therapy recommendation(s):    Continue home exercise program.

## 2022-06-10 NOTE — PROGRESS NOTES
Hospitalist Chart Check     Susanne Escoto is a 75 yo female with PMH significant for HTN, HLD GERD, CKD3b, leg edema, history recurrent UTI's and microscopic hematuria, hx lung cancer s/p left upper lobectomy 2014 who was admitted for elective left total knee arthroplasty. Uneventful operative course. Hospitalist was consulted post-op for medical management.    Hypertension  Chronic Systolic Heart Failure  Leg Edema  On chlorthalidone PTA with outpatient BP goal of <130/80 due to renal disease. LVEF of 40% with inferior WMA noted on prior echo.  -Monitor vital signs - BPs softer and PTA chlorthalidone held, imp;roving to 108/68  -Judicious use of IVF in the setting of CHF  -Continue to hold PTA chlorthalidone on discharge, patient to monitor BP each morning at home and resume when SBP >130. I d/w patient via telephone, she has a blood pressure cuff at home and agrees with the above plan.   -Elevate extremities  -Ok to discharge from Hospitalist perspective when potassium is administered and cleared by Orthopedics. Discharge med rec completed.     POD #1 s/p Left TKA  Hemodynamically stable with BP's in the 100's over 60's, heart rates in the 70's and currently satting 98% on 3 liters nasal cannula.  -Routine post-operative cares, IVF, DVT prophylaxis, and pain management to orthopedic service.  -Pain, activity, anticoagulation, BM regimen, therapies as per Ortho  -Wean oxygen to maintain sats >90%  -Pulmonary toileting post op--IS  -PT and OT to assess per Ortho.   -Bowel regimen in place while on pain regimen.   -Hgb adequate, 12.5 (baseline 13 range)  -No changes to PTA medication regimen at discharge unless issues arise throughout stay.    Recent Labs   Lab 06/10/22  0800   HGB 12.5        Mild hypokalemia  K+ 3.3.  - Replete with 20mEq x2    CKD Stage 3B, stable  CKD likely 2/2 analgesic and hypertensive nephrosclerosis. Baseline per chart review appears to be 1.1-1.26. BP has been managed OP with  chlorthalidone. Follows annually OP with Nephrology and was last seen in December 2021. Renal function with stable eGFR around 50 since 2006.  -No NSAIDs  -Avoid PPI due to potential risk to kidneys; H2 blocker OK  -Cr stable on BMP POD #1     Chronic stable diagnoses and other pertinent medical history: Appropriate PTA medications will be resumed  History of Recurrent UTI's: Noted. No acute issues.  Hyperlipidemia: Continue PTA statin     THALIA Blanchard, PA-C  Hospitalist KAELYN  [facility]  Securely message with the [employer] Web Console (learn more here)  Text page via [facility] Paging/Directory

## 2022-06-10 NOTE — PLAN OF CARE
Goal Outcome Evaluation:    Plan of Care Reviewed With: patient, daughter     Overall Patient Progress: improving     Patient vital signs are at baseline: Yes  Patient able to ambulate as they were prior to admission or with assist devices provided by therapies during their stay:  Yes  Patient MUST void prior to discharge:  Yes  Patient able to tolerate oral intake:  Yes  Pain has adequate pain control using Oral analgesics:  Yes  Does patient have an identified :  Yes  Has goal D/C date and time been discussed with patient:  Yes    Dressing CDI.  BP improved. Potassium replaced. Discharge instruction went over with pt/daughter, verbalized understanding. Will discharge home after PT.

## 2022-06-10 NOTE — PROGRESS NOTES
"Hospitalist Chart Check     Assessment:   Susanne Escoto is a 77 yo female with PMH significant for HTN, HLD GERD, CKD3b, leg edema, history recurrent UTI's and microscopic hematuria, hx lung cancer s/p left upper lobectomy 2014 who was admitted for elective left total knee arthroplasty. Uneventful operative course. Hospitalist was consulted post-op for medical management.    BP 97/64 (BP Location: Right arm)   Pulse 74   Temp 97.8  F (36.6  C) (Oral)   Resp 16   Ht 1.6 m (5' 3\")   Wt 91.6 kg (202 lb)   SpO2 95%   BMI 35.78 kg/m      Plan:  - BP remains soft, continue to hold PTA chlorthalidone for now.  - AM labs not yet drawn, Hgb, BMP, await results  - Will chart check again later today to follow up on above    THALIA Blanchard, PAMeganC  Hospitalist KAELYN  [facility]  Securely message with the [employer] Web Console (learn more here)  Text page via [facility] Paging/Directory    "

## 2022-06-12 NOTE — OP NOTE
Operative Note    Susanne Escoto MRN# [id number]   YOB: 1945 Age: 76 year old   Date of Procedure: 6/09/2022    1st Assistant:  Melanie Saez PA-C - Assisting  KRZYSZTOF Rod, KIRAN-LORENA - Assisting    PREOPERATIVE DIAGNOSIS: Left knee osteoarthritis, failure to respond to conservative management.     POSTOPERATIVE DIAGNOSIS: Left knee osteoarthritis, failure to respond to conservative management.     PROCEDURE: Left total knee arthroplasty, [place] Triathalon components, CR/CS.  Tourniquet-less technique.     DESCRIPTION OF PROCEDURE: Susanne Escoto was brought to the operating room. After satisfactory anesthesia, the left lower extremity was prepped and draped in the usual sterile fashion. The patient received 1 gram of Ancef and tranexamic acid preoperatively.     Straight anterior incision was made. Dissection was carried down to the extensor mechanism. Medial arthrotomy was made, midvastus exposure developed.    Advanced osteoarthritis was noted. Patella was exposed, measured and resected to accept a size 32mm, asymmetric patella. The knee was then flexed up. Intramedullary guide was placed at 5 degrees as per the preoperative plan. The distal femoral cut was made followed by anteroposterior cuts and chamfer cuts. Femur sized to be a size 3. PCL was recessed.  Attention was then directed to the tibia. Tibial cut was made perpendicular to the long axis of the tibia in both AP and lateral planes, 3 degree posterior slope. The tibia sized to be a size 2. Soft tissue balancing was performed. Trial reduction was performed and with a 10-mm CS insert, there was excellent soft tissue balancing, patellar tracking, alignment as well as motion. Trial components were removed. Tibial baseplate was prepared for size 2 baseplate. All 3 components were cemented in place without difficulty. Excess cement was removed.  A three minute betadine soak was performed.  The wound was thoroughly irrigated  and tissues were infiltrated with toradol/marcaine mixture.  The real 10-mm CS insert was impacted in place.  One gram of vancomycin powder was placed deep and superficial prior to closure. The extensor mechanism was closed in sequential layers including a running number 1 Stratafix, then augmented with interrupted 0 Vicryl and 0 ethibond suture. The skin was closed with interrupted 2-0 Vicryl subcutaneously, then a running 2-0 Stratafix, followed by a subcuticular 3-0 monocryl.  A mesh dressing with skin glue was applied. A sterile dressing was applied. The patient left the operating room in satisfactory condition.  A skilled first assistant was necessary for this procedure for assistance with patient positioning, prepping, draping, surgical visualization, performance of the repair, wound closure, and application of the dressing.    MD LAZARO Jennings MD

## 2022-06-14 ENCOUNTER — THERAPY VISIT (OUTPATIENT)
Dept: PHYSICAL THERAPY | Facility: CLINIC | Age: 77
End: 2022-06-14
Payer: MEDICARE

## 2022-06-14 DIAGNOSIS — M25.562 ACUTE PAIN OF LEFT KNEE: ICD-10-CM

## 2022-06-14 DIAGNOSIS — Z47.89 AFTERCARE FOLLOWING SURGERY OF THE MUSCULOSKELETAL SYSTEM: ICD-10-CM

## 2022-06-14 PROCEDURE — 97110 THERAPEUTIC EXERCISES: CPT | Mod: GP | Performed by: PHYSICAL THERAPIST

## 2022-06-14 PROCEDURE — 97161 PT EVAL LOW COMPLEX 20 MIN: CPT | Mod: GP | Performed by: PHYSICAL THERAPIST

## 2022-06-14 ASSESSMENT — ACTIVITIES OF DAILY LIVING (ADL)
GO DOWN STAIRS: ACTIVITY IS MINIMALLY DIFFICULT
STIFFNESS: THE SYMPTOM AFFECTS MY ACTIVITY SLIGHTLY
AS_A_RESULT_OF_YOUR_KNEE_INJURY,_HOW_WOULD_YOU_RATE_YOUR_CURRENT_LEVEL_OF_DAILY_ACTIVITY?: NEARLY NORMAL
PAIN: THE SYMPTOM AFFECTS MY ACTIVITY SLIGHTLY
GO UP STAIRS: ACTIVITY IS MINIMALLY DIFFICULT
KNEEL ON THE FRONT OF YOUR KNEE: I AM UNABLE TO DO THE ACTIVITY
STAND: NOT ANSWERED
SWELLING: THE SYMPTOM AFFECTS MY ACTIVITY SLIGHTLY
RISE FROM A CHAIR: ACTIVITY IS MINIMALLY DIFFICULT
HOW_WOULD_YOU_RATE_THE_OVERALL_FUNCTION_OF_YOUR_KNEE_DURING_YOUR_USUAL_DAILY_ACTIVITIES?: NEARLY NORMAL
WEAKNESS: THE SYMPTOM AFFECTS MY ACTIVITY SLIGHTLY
LIMPING: I DO NOT HAVE THE SYMPTOM
SQUAT: NOT ANSWERED
GIVING WAY, BUCKLING OR SHIFTING OF KNEE: I DO NOT HAVE THE SYMPTOM
KNEE_ACTIVITY_OF_DAILY_LIVING_SUM: 43
WALK: ACTIVITY IS NOT DIFFICULT
SIT WITH YOUR KNEE BENT: ACTIVITY IS MINIMALLY DIFFICULT

## 2022-06-14 NOTE — PROGRESS NOTES
Physical Therapy Initial Evaluation  Subjective:  The history is provided by the patient and a relative.   Therapist Generated HPI Evaluation  Problem details: Presents today 5 days s/p left TKA.  Has had left knee pain for 1+ years.  Had a rjght TKA 2.5 years and doing well.  Has had two left knee injections.  Pt wants to be able to go up and down stairs ASAP as she prefers to the basement bathroom. Has two stairs from garage and unable to do reciprocally.  .         Type of problem:  Left knee.      Condition occurred with:  Degenerative joint disease.    Patient reports pain:  Anterior and posterior.  Pain is described as aching and is intermittent.  Pain is worse during the day.  Since onset symptoms are gradually improving.  Associated symptoms:  Loss of motion/stiffness, loss of strength and edema. Symptoms are exacerbated by weight bearing, descending stairs and ascending stairs  and relieved by rest, ice and analgesics.      Barriers include:  None as reported by patient.                        Objective:    Gait:    Gait Type:  Antalgic   Weight Bearing Status:  WBAT   Assistive Devices:  Walker  Deviations:  General Deviations:  Base of support incr, stride length decr and danae decr                                                      Knee Evaluation:  ROM:  Strength wnl knee: good quad set with cues.  min, partial assist with SLR, 2 x 5.  able to do independently after rx    AROM    Hyperextension: Left:  0    Right:  Extension: Left: 2    Right:   Flexion: Left: 90   Right:          Ligament Testing:  Not Assessed                Special Tests: Not Assessed      Palpation:  Not Assessed      Edema:  Edema of the knee: figure 8 right ankle 56.5.  Circumference:      Joint Line:  Left:  + 3cm         Functional Testing:  : able to transfer independently, some difficulty with supine to and from sit.                    General     ROS    Assessment/Plan:    Patient is a 76 year old female with left side  knee complaints.    Patient has the following significant findings with corresponding treatment plan.                Diagnosis 1:  Left TKA  Pain -  self management, education and home program  Decreased ROM/flexibility - therapeutic exercise, therapeutic activity and home program  Decreased joint mobility - therapeutic exercise, therapeutic activity and home program  Decreased strength - therapeutic exercise, therapeutic activities and home program  Impaired balance - neuro re-education, therapeutic activities and home program  Decreased proprioception - neuro re-education, therapeutic activities and home program  Impaired gait - gait training, assistive devices and home program  Decreased function - therapeutic activities and home program    Therapy Evaluation Codes:     1) Clinical presentation characteristics are:   Stable/Uncomplicated.  2) Decision-Making    Low complexity using standardized patient assessment instrument and/or measureable assessment of functional outcome.  Cumulative Therapy Evaluation is: Low complexity.    Previous and current functional limitations:  (See Goal Flow Sheet for this information)    Short term and Long term goals: (See Goal Flow Sheet for this information)     Communication ability:  Patient appears to be able to clearly communicate and understand verbal and written communication and follow directions correctly.  Treatment Explanation - The following has been discussed with the patient:   RX ordered/plan of care  Anticipated outcomes  Possible risks and side effects  This patient would benefit from PT intervention to resume normal activities.   Rehab potential is good.    Frequency:  2 X week, once daily  Duration:  for 2 weeks tapering to 1 X a week over 6 weeks  Discharge Plan:  Achieve all LTG.  Independent in home treatment program.  Reach maximal therapeutic benefit.    Please refer to the daily flowsheet for treatment today, total treatment time and time spent performing  1:1 timed codes.

## 2022-06-14 NOTE — PROGRESS NOTES
[facility]    OUTPATIENT Physical Therapy ORTHOPEDIC EVALUATION  PLAN OF TREATMENT FOR OUTPATIENT REHABILITATION  (COMPLETE FOR INITIAL CLAIMS ONLY)  Patient's Last Name, First Name, M.I.  YOB: 1945  Susanne Escoto    Provider s Name:  [facility]   Medical Record No.  [id number]   Start of Care Date:      Onset Date:  06/09/22    Type:     _X__PT   ___OT Medical Diagnosis:    Encounter Diagnoses   Name Primary?    Acute pain of left knee     Aftercare following surgery of the musculoskeletal system         Treatment Diagnosis:  Left TKA        Goals:     06/14/22 0500   Body Part   Goals listed below are for Left TKA   Goal #1   Goal #1 ambulation   Previous Functional Level No restrictions   Current Functional Level Blocks patient can walk   Performance Level 1   STG Target Performance [place]ocks patient will be able to  walk   Performance Level 6-9 c SEC   Rationale for safe household ambulation;for safe outdoor household ambulation;for safe community ambulation;for safe work place ambulation   Due Date 07/12/22    LTG Target Performance Miles patient will be able to  walk   Performance Level 1.5   Rationale for safe outdoor household ambulation;for safe community ambulation;for safe work place ambulation;to maintain proper body mechanics/posture while ambulating to avoid additional compensatory injury due to improper gait mechanics;to promote a healthy and active lifestyle   Due Date 08/09/22       Therapy Frequency:     Predicted Duration of Therapy Intervention:       Mina Dooley, PT                 I CERTIFY THE NEED FOR THESE SERVICES FURNISHED UNDER        THIS PLAN OF TREATMENT AND WHILE UNDER MY CARE .             Physician Signature               Date    X_____________________________________________________                         Certification  Date From:      Certification Date To:       Referring Provider:  Melanie Saez    Initial Assessment        See Epic Evaluation

## 2022-06-16 ENCOUNTER — THERAPY VISIT (OUTPATIENT)
Dept: PHYSICAL THERAPY | Facility: CLINIC | Age: 77
End: 2022-06-16
Payer: MEDICARE

## 2022-06-16 DIAGNOSIS — M25.562 ACUTE PAIN OF LEFT KNEE: Primary | ICD-10-CM

## 2022-06-16 DIAGNOSIS — Z47.89 AFTERCARE FOLLOWING SURGERY OF THE MUSCULOSKELETAL SYSTEM: ICD-10-CM

## 2022-06-16 PROCEDURE — 97112 NEUROMUSCULAR REEDUCATION: CPT | Mod: GP | Performed by: PHYSICAL THERAPIST

## 2022-06-16 PROCEDURE — 97110 THERAPEUTIC EXERCISES: CPT | Mod: GP | Performed by: PHYSICAL THERAPIST

## 2022-06-16 NOTE — PROGRESS NOTES
[facility]    OUTPATIENT Physical Therapy ORTHOPEDIC EVALUATION  PLAN OF TREATMENT FOR OUTPATIENT REHABILITATION  (COMPLETE FOR INITIAL CLAIMS ONLY)  Patient's Last Name, First Name, M.I.  YOB: 1945  Susanne Escoto    Provider s Name:  [facility]   Medical Record No.  [id number]   Start of Care Date:  06/14/22   Onset Date:  06/09/22    Type:     _X__PT   ___OT Medical Diagnosis:    Encounter Diagnoses   Name Primary?    Acute pain of left knee     Aftercare following surgery of the musculoskeletal system         Treatment Diagnosis:  Left TKA        Goals:     06/14/22 0500   Body Part   Goals listed below are for Left TKA   Goal #1   Goal #1 ambulation   Previous Functional Level No restrictions   Current Functional Level Blocks patient can walk   Performance Level 1   STG Target Performance [place]ocks patient will be able to  walk   Performance Level 6-9 c SEC   Rationale for safe household ambulation;for safe outdoor household ambulation;for safe community ambulation;for safe work place ambulation   Due Date 07/12/22    LTG Target Performance Miles patient will be able to  walk   Performance Level 1.5   Rationale for safe outdoor household ambulation;for safe community ambulation;for safe work place ambulation;to maintain proper body mechanics/posture while ambulating to avoid additional compensatory injury due to improper gait mechanics;to promote a healthy and active lifestyle   Due Date 08/09/22       Therapy Frequency:  2x/week x 3, then 1x/week  Predicted Duration of Therapy Intervention:  8    Mina Dooley, SILVERIO                 I CERTIFY THE NEED FOR THESE SERVICES FURNISHED UNDER        THIS PLAN OF TREATMENT AND WHILE UNDER MY CARE .             Physician Signature               Date    X_____________________________________________________                          Certification Date From:  06/14/22   Certification Date To:  08/11/22    Referring Provider:  Melanie Saez    Initial Assessment        See Epic Evaluation SOC Date: 06/14/22

## 2022-06-21 ENCOUNTER — THERAPY VISIT (OUTPATIENT)
Dept: PHYSICAL THERAPY | Facility: CLINIC | Age: 77
End: 2022-06-21
Payer: MEDICARE

## 2022-06-21 DIAGNOSIS — Z47.89 AFTERCARE FOLLOWING SURGERY OF THE MUSCULOSKELETAL SYSTEM: ICD-10-CM

## 2022-06-21 DIAGNOSIS — M25.562 ACUTE PAIN OF LEFT KNEE: Primary | ICD-10-CM

## 2022-06-21 PROCEDURE — 97110 THERAPEUTIC EXERCISES: CPT | Mod: GP | Performed by: PHYSICAL THERAPIST

## 2022-06-21 PROCEDURE — 97112 NEUROMUSCULAR REEDUCATION: CPT | Mod: GP | Performed by: PHYSICAL THERAPIST

## 2022-06-24 ENCOUNTER — THERAPY VISIT (OUTPATIENT)
Dept: PHYSICAL THERAPY | Facility: CLINIC | Age: 77
End: 2022-06-24
Payer: MEDICARE

## 2022-06-24 DIAGNOSIS — M25.562 ACUTE PAIN OF LEFT KNEE: Primary | ICD-10-CM

## 2022-06-24 DIAGNOSIS — Z47.89 AFTERCARE FOLLOWING SURGERY OF THE MUSCULOSKELETAL SYSTEM: ICD-10-CM

## 2022-06-24 PROCEDURE — 97112 NEUROMUSCULAR REEDUCATION: CPT | Mod: GP | Performed by: PHYSICAL THERAPIST

## 2022-06-24 PROCEDURE — 97110 THERAPEUTIC EXERCISES: CPT | Mod: GP | Performed by: PHYSICAL THERAPIST

## 2022-07-01 ENCOUNTER — THERAPY VISIT (OUTPATIENT)
Dept: PHYSICAL THERAPY | Facility: CLINIC | Age: 77
End: 2022-07-01
Payer: MEDICARE

## 2022-07-01 ENCOUNTER — APPOINTMENT (OUTPATIENT)
Dept: ULTRASOUND IMAGING | Facility: CLINIC | Age: 77
End: 2022-07-01
Attending: EMERGENCY MEDICINE
Payer: MEDICARE

## 2022-07-01 ENCOUNTER — HOSPITAL ENCOUNTER (OUTPATIENT)
Facility: CLINIC | Age: 77
Setting detail: OBSERVATION
Discharge: HOME | End: 2022-07-02
Attending: EMERGENCY MEDICINE | Admitting: INTERNAL MEDICINE
Payer: MEDICARE

## 2022-07-01 DIAGNOSIS — R42 LIGHTHEADEDNESS: ICD-10-CM

## 2022-07-01 DIAGNOSIS — L03.116 CELLULITIS OF LEFT LOWER EXTREMITY: ICD-10-CM

## 2022-07-01 DIAGNOSIS — Z47.89 AFTERCARE FOLLOWING SURGERY OF THE MUSCULOSKELETAL SYSTEM: ICD-10-CM

## 2022-07-01 DIAGNOSIS — M62.81 GENERALIZED MUSCLE WEAKNESS: ICD-10-CM

## 2022-07-01 DIAGNOSIS — M25.562 ACUTE PAIN OF LEFT KNEE: Primary | ICD-10-CM

## 2022-07-01 LAB
ALBUMIN UR-MCNC: NEGATIVE MG/DL
ANION GAP SERPL CALCULATED.3IONS-SCNC: 3 MMOL/L (ref 3–14)
APPEARANCE UR: CLEAR
BACTERIA #/AREA URNS HPF: ABNORMAL /HPF
BASOPHILS # BLD AUTO: 0.1 10E3/UL (ref 0–0.2)
BASOPHILS NFR BLD AUTO: 2 %
BILIRUB UR QL STRIP: NEGATIVE
BUN SERPL-MCNC: 20 MG/DL (ref 7–30)
CALCIUM SERPL-MCNC: 9.5 MG/DL (ref 8.5–10.1)
CHLORIDE BLD-SCNC: 103 MMOL/L (ref 94–109)
CO2 SERPL-SCNC: 29 MMOL/L (ref 20–32)
COLOR UR AUTO: ABNORMAL
CREAT SERPL-MCNC: 1.03 MG/DL (ref 0.52–1.04)
EOSINOPHIL # BLD AUTO: 0.4 10E3/UL (ref 0–0.7)
EOSINOPHIL NFR BLD AUTO: 5 %
ERYTHROCYTE [DISTWIDTH] IN BLOOD BY AUTOMATED COUNT: 13.7 % (ref 10–15)
GFR SERPL CREATININE-BSD FRML MDRD: 55 ML/MIN/1.73M2
GLUCOSE BLD-MCNC: 94 MG/DL (ref 70–99)
GLUCOSE UR STRIP-MCNC: NEGATIVE MG/DL
HCT VFR BLD AUTO: 36.5 % (ref 35–47)
HGB BLD-MCNC: 11.6 G/DL (ref 11.7–15.7)
HGB UR QL STRIP: ABNORMAL
HOLD SPECIMEN: NORMAL
IMM GRANULOCYTES # BLD: 0.1 10E3/UL
IMM GRANULOCYTES NFR BLD: 1 %
KETONES UR STRIP-MCNC: NEGATIVE MG/DL
LEUKOCYTE ESTERASE UR QL STRIP: ABNORMAL
LYMPHOCYTES # BLD AUTO: 2.4 10E3/UL (ref 0.8–5.3)
LYMPHOCYTES NFR BLD AUTO: 31 %
MCH RBC QN AUTO: 30.3 PG (ref 26.5–33)
MCHC RBC AUTO-ENTMCNC: 31.8 G/DL (ref 31.5–36.5)
MCV RBC AUTO: 95 FL (ref 78–100)
MONOCYTES # BLD AUTO: 1.1 10E3/UL (ref 0–1.3)
MONOCYTES NFR BLD AUTO: 14 %
NEUTROPHILS # BLD AUTO: 3.8 10E3/UL (ref 1.6–8.3)
NEUTROPHILS NFR BLD AUTO: 47 %
NITRATE UR QL: NEGATIVE
NRBC # BLD AUTO: 0 10E3/UL
NRBC BLD AUTO-RTO: 0 /100
PH UR STRIP: 6 [PH] (ref 5–7)
PLATELET # BLD AUTO: 462 10E3/UL (ref 150–450)
POTASSIUM BLD-SCNC: 4 MMOL/L (ref 3.4–5.3)
RBC # BLD AUTO: 3.83 10E6/UL (ref 3.8–5.2)
RBC URINE: 3 /HPF
SODIUM SERPL-SCNC: 135 MMOL/L (ref 133–144)
SP GR UR STRIP: 1.01 (ref 1–1.03)
SQUAMOUS EPITHELIAL: 1 /HPF
UROBILINOGEN UR STRIP-MCNC: NORMAL MG/DL
WBC # BLD AUTO: 7.9 10E3/UL (ref 4–11)
WBC URINE: 4 /HPF

## 2022-07-01 PROCEDURE — 36415 COLL VENOUS BLD VENIPUNCTURE: CPT | Performed by: EMERGENCY MEDICINE

## 2022-07-01 PROCEDURE — 99285 EMERGENCY DEPT VISIT HI MDM: CPT | Mod: CS,25

## 2022-07-01 PROCEDURE — 96365 THER/PROPH/DIAG IV INF INIT: CPT

## 2022-07-01 PROCEDURE — C9803 HOPD COVID-19 SPEC COLLECT: HCPCS

## 2022-07-01 PROCEDURE — 93971 EXTREMITY STUDY: CPT | Mod: LT

## 2022-07-01 PROCEDURE — 81001 URINALYSIS AUTO W/SCOPE: CPT | Performed by: EMERGENCY MEDICINE

## 2022-07-01 PROCEDURE — 99220 PR INITIAL OBSERVATION CARE,LEVEL III: CPT | Performed by: INTERNAL MEDICINE

## 2022-07-01 PROCEDURE — 250N000013 HC RX MED GY IP 250 OP 250 PS 637: Performed by: EMERGENCY MEDICINE

## 2022-07-01 PROCEDURE — 250N000011 HC RX IP 250 OP 636: Performed by: EMERGENCY MEDICINE

## 2022-07-01 PROCEDURE — 96366 THER/PROPH/DIAG IV INF ADDON: CPT

## 2022-07-01 PROCEDURE — 97110 THERAPEUTIC EXERCISES: CPT | Mod: GP | Performed by: PHYSICAL THERAPIST

## 2022-07-01 PROCEDURE — 96361 HYDRATE IV INFUSION ADD-ON: CPT

## 2022-07-01 PROCEDURE — G0378 HOSPITAL OBSERVATION PER HR: HCPCS

## 2022-07-01 PROCEDURE — 258N000003 HC RX IP 258 OP 636: Performed by: EMERGENCY MEDICINE

## 2022-07-01 PROCEDURE — 93005 ELECTROCARDIOGRAM TRACING: CPT

## 2022-07-01 PROCEDURE — 85025 COMPLETE CBC W/AUTO DIFF WBC: CPT | Performed by: EMERGENCY MEDICINE

## 2022-07-01 PROCEDURE — 97112 NEUROMUSCULAR REEDUCATION: CPT | Mod: GP | Performed by: PHYSICAL THERAPIST

## 2022-07-01 PROCEDURE — U0003 INFECTIOUS AGENT DETECTION BY NUCLEIC ACID (DNA OR RNA); SEVERE ACUTE RESPIRATORY SYNDROME CORONAVIRUS 2 (SARS-COV-2) (CORONAVIRUS DISEASE [COVID-19]), AMPLIFIED PROBE TECHNIQUE, MAKING USE OF HIGH THROUGHPUT TECHNOLOGIES AS DESCRIBED BY CMS-2020-01-R: HCPCS | Performed by: EMERGENCY MEDICINE

## 2022-07-01 PROCEDURE — 80048 BASIC METABOLIC PNL TOTAL CA: CPT | Performed by: EMERGENCY MEDICINE

## 2022-07-01 PROCEDURE — 86140 C-REACTIVE PROTEIN: CPT | Performed by: INTERNAL MEDICINE

## 2022-07-01 RX ORDER — SULFAMETHOXAZOLE/TRIMETHOPRIM 800-160 MG
1 TABLET ORAL ONCE
Status: COMPLETED | OUTPATIENT
Start: 2022-07-01 | End: 2022-07-01

## 2022-07-01 RX ORDER — SODIUM CHLORIDE 9 MG/ML
INJECTION, SOLUTION INTRAVENOUS CONTINUOUS
Status: DISCONTINUED | OUTPATIENT
Start: 2022-07-01 | End: 2022-07-02

## 2022-07-01 RX ORDER — CEFTRIAXONE 2 G/1
2 INJECTION, POWDER, FOR SOLUTION INTRAMUSCULAR; INTRAVENOUS ONCE
Status: COMPLETED | OUTPATIENT
Start: 2022-07-01 | End: 2022-07-02

## 2022-07-01 RX ADMIN — SODIUM CHLORIDE 1000 ML: 9 INJECTION, SOLUTION INTRAVENOUS at 22:06

## 2022-07-01 RX ADMIN — SULFAMETHOXAZOLE AND TRIMETHOPRIM 1 TABLET: 800; 160 TABLET ORAL at 23:45

## 2022-07-01 RX ADMIN — CEFTRIAXONE 2 G: 2 INJECTION, POWDER, FOR SOLUTION INTRAMUSCULAR; INTRAVENOUS at 23:48

## 2022-07-01 NOTE — ED TRIAGE NOTES
Known UTI for the past week. Has been on Macrobid. Changed to a different medication for continued symptoms and now feels dizziness and weak.

## 2022-07-02 VITALS
RESPIRATION RATE: 18 BRPM | OXYGEN SATURATION: 97 % | HEART RATE: 98 BPM | HEIGHT: 63 IN | BODY MASS INDEX: 35.98 KG/M2 | TEMPERATURE: 97.6 F | SYSTOLIC BLOOD PRESSURE: 121 MMHG | DIASTOLIC BLOOD PRESSURE: 67 MMHG | WEIGHT: 203.04 LBS

## 2022-07-02 LAB
ANION GAP SERPL CALCULATED.3IONS-SCNC: 6 MMOL/L (ref 3–14)
BASOPHILS # BLD AUTO: 0.1 10E3/UL (ref 0–0.2)
BASOPHILS NFR BLD AUTO: 1 %
BUN SERPL-MCNC: 13 MG/DL (ref 7–30)
CALCIUM SERPL-MCNC: 9.1 MG/DL (ref 8.5–10.1)
CHLORIDE BLD-SCNC: 106 MMOL/L (ref 94–109)
CO2 SERPL-SCNC: 24 MMOL/L (ref 20–32)
CREAT SERPL-MCNC: 1.02 MG/DL (ref 0.52–1.04)
CRP SERPL-MCNC: 73.3 MG/L (ref 0–8)
EOSINOPHIL # BLD AUTO: 0.4 10E3/UL (ref 0–0.7)
EOSINOPHIL NFR BLD AUTO: 5 %
ERYTHROCYTE [DISTWIDTH] IN BLOOD BY AUTOMATED COUNT: 13.6 % (ref 10–15)
ERYTHROCYTE [SEDIMENTATION RATE] IN BLOOD BY WESTERGREN METHOD: 60 MM/HR (ref 0–30)
GFR SERPL CREATININE-BSD FRML MDRD: 56 ML/MIN/1.73M2
GLUCOSE BLD-MCNC: 90 MG/DL (ref 70–99)
HCT VFR BLD AUTO: 34.7 % (ref 35–47)
HGB BLD-MCNC: 11 G/DL (ref 11.7–15.7)
IMM GRANULOCYTES # BLD: 0 10E3/UL
IMM GRANULOCYTES NFR BLD: 0 %
LYMPHOCYTES # BLD AUTO: 1.5 10E3/UL (ref 0.8–5.3)
LYMPHOCYTES NFR BLD AUTO: 22 %
MCH RBC QN AUTO: 30 PG (ref 26.5–33)
MCHC RBC AUTO-ENTMCNC: 31.7 G/DL (ref 31.5–36.5)
MCV RBC AUTO: 95 FL (ref 78–100)
MONOCYTES # BLD AUTO: 0.9 10E3/UL (ref 0–1.3)
MONOCYTES NFR BLD AUTO: 13 %
NEUTROPHILS # BLD AUTO: 4 10E3/UL (ref 1.6–8.3)
NEUTROPHILS NFR BLD AUTO: 59 %
NRBC # BLD AUTO: 0 10E3/UL
NRBC BLD AUTO-RTO: 0 /100
PLATELET # BLD AUTO: 475 10E3/UL (ref 150–450)
POTASSIUM BLD-SCNC: 4.1 MMOL/L (ref 3.4–5.3)
RBC # BLD AUTO: 3.67 10E6/UL (ref 3.8–5.2)
SARS-COV-2 RNA RESP QL NAA+PROBE: NEGATIVE
SODIUM SERPL-SCNC: 136 MMOL/L (ref 133–144)
WBC # BLD AUTO: 6.9 10E3/UL (ref 4–11)

## 2022-07-02 PROCEDURE — 250N000013 HC RX MED GY IP 250 OP 250 PS 637: Performed by: INTERNAL MEDICINE

## 2022-07-02 PROCEDURE — 99217 PR OBSERVATION CARE DISCHARGE: CPT | Performed by: INTERNAL MEDICINE

## 2022-07-02 PROCEDURE — 85025 COMPLETE CBC W/AUTO DIFF WBC: CPT | Performed by: INTERNAL MEDICINE

## 2022-07-02 PROCEDURE — 36415 COLL VENOUS BLD VENIPUNCTURE: CPT | Performed by: INTERNAL MEDICINE

## 2022-07-02 PROCEDURE — G0378 HOSPITAL OBSERVATION PER HR: HCPCS

## 2022-07-02 PROCEDURE — 258N000003 HC RX IP 258 OP 636: Performed by: INTERNAL MEDICINE

## 2022-07-02 PROCEDURE — 85652 RBC SED RATE AUTOMATED: CPT | Performed by: INTERNAL MEDICINE

## 2022-07-02 PROCEDURE — 82310 ASSAY OF CALCIUM: CPT | Performed by: INTERNAL MEDICINE

## 2022-07-02 RX ORDER — SULFAMETHOXAZOLE/TRIMETHOPRIM 800-160 MG
1 TABLET ORAL 2 TIMES DAILY
Status: DISCONTINUED | OUTPATIENT
Start: 2022-07-02 | End: 2022-07-02 | Stop reason: HOSPADM

## 2022-07-02 RX ORDER — ACETAMINOPHEN 325 MG/1
650 TABLET ORAL EVERY 6 HOURS PRN
Status: DISCONTINUED | OUTPATIENT
Start: 2022-07-02 | End: 2022-07-02 | Stop reason: HOSPADM

## 2022-07-02 RX ORDER — SODIUM CHLORIDE 9 MG/ML
INJECTION, SOLUTION INTRAVENOUS CONTINUOUS
Status: DISCONTINUED | OUTPATIENT
Start: 2022-07-02 | End: 2022-07-02

## 2022-07-02 RX ORDER — ONDANSETRON 2 MG/ML
4 INJECTION INTRAMUSCULAR; INTRAVENOUS EVERY 6 HOURS PRN
Status: DISCONTINUED | OUTPATIENT
Start: 2022-07-02 | End: 2022-07-02 | Stop reason: HOSPADM

## 2022-07-02 RX ORDER — AMOXICILLIN 250 MG
1-2 CAPSULE ORAL 2 TIMES DAILY
Status: DISCONTINUED | OUTPATIENT
Start: 2022-07-02 | End: 2022-07-02 | Stop reason: HOSPADM

## 2022-07-02 RX ORDER — ACETAMINOPHEN 650 MG/1
650 SUPPOSITORY RECTAL EVERY 6 HOURS PRN
Status: DISCONTINUED | OUTPATIENT
Start: 2022-07-02 | End: 2022-07-02 | Stop reason: HOSPADM

## 2022-07-02 RX ORDER — ONDANSETRON 4 MG/1
4 TABLET, ORALLY DISINTEGRATING ORAL EVERY 6 HOURS PRN
Status: DISCONTINUED | OUTPATIENT
Start: 2022-07-02 | End: 2022-07-02 | Stop reason: HOSPADM

## 2022-07-02 RX ADMIN — Medication 1 MG: at 02:09

## 2022-07-02 RX ADMIN — ACETAMINOPHEN 650 MG: 325 TABLET, FILM COATED ORAL at 02:09

## 2022-07-02 RX ADMIN — ASPIRIN 325 MG: 325 TABLET, COATED ORAL at 12:27

## 2022-07-02 RX ADMIN — ACETAMINOPHEN 650 MG: 325 TABLET, FILM COATED ORAL at 09:00

## 2022-07-02 RX ADMIN — SULFAMETHOXAZOLE AND TRIMETHOPRIM 1 TABLET: 800; 160 TABLET ORAL at 08:53

## 2022-07-02 RX ADMIN — SODIUM CHLORIDE: 9 INJECTION, SOLUTION INTRAVENOUS at 02:08

## 2022-07-02 NOTE — ED PROVIDER NOTES
History   Chief Complaint:  Dizziness and Generalized Weakness       The history is provided by a relative and the patient.      Susanne Escoto is a 76 year old female with history of hypertension and chronic kidney disease who presents with weakness and dizziness. The patient reports that she tested positive for a UTI 4 days ago and experienced cloudy urine, fever and weakness. Today, the patient has no fever or cloudy urine. She endorses feeling dizzy, weakness, and nausea. She denies fainting, falling, shortness of breath, dysuria and chest pain. The patient endorses eating less than normal the past few days and a small amount of diarrhea. The patient had a right knee surgery and had a recent appointment last week, in which the doctor said recovery looked normal.     Review of Systems   All other systems reviewed and are negative.    Allergies:  No Known Allergies    Medications:  Lipitor  Calcium + D3  Hygroton  Pepcid  Move free PO  Magnesium  Melatonin  Roxicodone  Probiotic  Senna-docusate  Vitamin D3     Past Medical History:     Diverticulitis  Chronic kidney disease  Hematuria  Edema  Bacteremia  Hyponatremia  Hypokalemia  Bilateral primary osteoarthritis of knee  Malignant tumor of lung  GERD  Hyperlipidemia  Hypertension    Past Surgical History:    Arthroplasty right knee  Arthroplasty left knee  AS esophagoscopy  Lung surgery  Orthopedic right thumb cyst removal  Thoracotomy  Lobectomy  Lipoma resection    Family History:    Father: Liver cancer  Mother: Hypertension, stroke  Sister: Breast cancer    Social History:  Accompanied by daughter.    Physical Exam     Patient Vitals for the past 24 hrs:   BP Temp Temp src Pulse Resp SpO2 Height Weight   07/01/22 2230 (!) 162/96 -- -- 97 -- 98 % -- --   07/01/22 2215 (!) 142/88 -- -- -- -- 95 % -- --   07/01/22 2115 (!) 144/85 -- -- 83 -- 99 % -- --   07/01/22 2030 (!) 142/95 -- -- 91 -- 98 % -- --   07/01/22 2015 (!) 144/85 -- -- 85 -- 99 % -- --  "  07/01/22 2000 (!) 146/89 -- -- 89 -- 99 % -- --   07/01/22 1955 (!) 155/91 -- -- 89 -- -- -- --   07/01/22 1440 (!) 148/88 97.6  F (36.4  C) Temporal 98 16 99 % 1.6 m (5' 3\") 92.1 kg (203 lb 0.7 oz)       Physical Exam  General: Resting on the bed.  Head: No obvious trauma to head.  Ears, Nose, Throat:  External ears normal.  Nose normal.   Eyes:  Conjunctivae clear.    CV: Regular rate and rhythm.  No murmurs.      Respiratory: Effort normal and breath sounds normal.  No wheezing or crackles.   Gastrointestinal: Soft.  No distension. There is no tenderness.    Musculoskeletal: Left knee with perserved range of motion.  No pain.  No obvious effusion.  2+ DP pulses.  Sensation intact light touch.  Neuro: Alert. Moving all extremities appropriately.  Normal speech.    Skin: Skin is warm and dry.  See below           Emergency Department Course   ECG  ECG results from 07/01/22   EKG 12 lead     Value    Systolic Blood Pressure     Diastolic Blood Pressure     Ventricular Rate 96    Atrial Rate 96    MS Interval 160    QRS Duration 78        QTc 439    P Axis 73    R AXIS -6    T Axis 30    Interpretation ECG      Sinus rhythm  Possible Left atrial enlargement  Borderline ECG  When compared with ECG of 10-JUL-2006 11:25,  Vent. rate has increased BY  35 BPM         Imaging:  US Lower Extremity Venous Duplex Left   Final Result   IMPRESSION:   1.  No deep venous thrombosis in the left lower extremity. 5.5 x 3.1 x 1.5 cm Baker's cyst left popliteal fossa.        Report per radiology    Laboratory:  Labs Ordered and Resulted from Time of ED Arrival to Time of ED Departure   BASIC METABOLIC PANEL - Abnormal       Result Value    Sodium 135      Potassium 4.0      Chloride 103      Carbon Dioxide (CO2) 29      Anion Gap 3      Urea Nitrogen 20      Creatinine 1.03      Calcium 9.5      Glucose 94      GFR Estimate 55 (*)    CBC WITH PLATELETS AND DIFFERENTIAL - Abnormal    WBC Count 7.9      RBC Count 3.83      " Hemoglobin 11.6 (*)     Hematocrit 36.5      MCV 95      MCH 30.3      MCHC 31.8      RDW 13.7      Platelet Count 462 (*)     % Neutrophils 47      % Lymphocytes 31      % Monocytes 14      % Eosinophils 5      % Basophils 2      % Immature Granulocytes 1      NRBCs per 100 WBC 0      Absolute Neutrophils 3.8      Absolute Lymphocytes 2.4      Absolute Monocytes 1.1      Absolute Eosinophils 0.4      Absolute Basophils 0.1      Absolute Immature Granulocytes 0.1      Absolute NRBCs 0.0     ROUTINE UA WITH MICROSCOPIC REFLEX TO CULTURE - Abnormal    Color Urine Straw      Appearance Urine Clear      Glucose Urine Negative      Bilirubin Urine Negative      Ketones Urine Negative      Specific Gravity Urine 1.008      Blood Urine Small (*)     pH Urine 6.0      Protein Albumin Urine Negative      Urobilinogen Urine Normal      Nitrite Urine Negative      Leukocyte Esterase Urine Trace (*)     Bacteria Urine Few (*)     RBC Urine 3 (*)     WBC Urine 4      Squamous Epithelials Urine 1     COVID-19 VIRUS (CORONAVIRUS) BY PCR        Procedures      Emergency Department Course:     Reviewed:  I reviewed nursing notes, vitals, past medical history and Care Everywhere    Assessments:   I obtained history and examined the patient as noted above.    I rechecked the patient and explained findings.     Interventions:  Medications   0.9% sodium chloride BOLUS (1,000 mLs Intravenous New Bag 22)     Followed by   sodium chloride 0.9% infusion (has no administration in time range)   cefTRIAXone (ROCEPHIN) 2 g vial to attach to  ml bag for ADULTS or NS 50 ml bag for PEDS (has no administration in time range)     Disposition:  The patient was admitted to the hospital under the care of Dr. Meyers.     Impression & Plan     Medical Decision Makin-year-old female presents with generalized weakness, lightheadedness, fatigue.  Vital signs are reassuring.  Broad differentials pursued including but not limited  to abscess, cellulitis, severe sepsis, septic shock, dehydration, ACS arrhythmia, DVT, neurovascular injury, etc.  CBC without leukocytosis or significant anemia.  BMP without acute electrolyte, metabolic or renal dysfunction.  UA looks grossly unremarkable with trace leuk esterase and 3 red blood cells.  Markedly improved from previous UA.  Ultrasound shows no evidence of acute DVT.  Baker's cyst is noted.  No signs of abscess.  No signs of septic joint, she appears to be moving the knee freely.  No tenderness with range of motion.  Neurovascular intact.  Fluids were given.  No signs of sepsis.  EKG showed sinus rhythm, no acute ischemic changes.  No signs of arrhythmia.  Patient given IV fluids.  Had discussion with her and concerned mostly about a cellulitis surrounding her surgical site.  She feels generally weak and does not feel well enough for discharge.  Plan admit for IV fluids and antibiotics.  Discussed with hospitalist who graciously accepted.    Critical Care Time: was 0 minutes for this patient excluding procedures    Diagnosis:    ICD-10-CM    1. Cellulitis of left lower extremity  L03.116    2. Generalized muscle weakness  M62.81    3. Lightheadedness  R42        Discharge Medications:  New Prescriptions    No medications on file       Scribe Disclosure:  I, Tammy Guzman, am serving as a scribe at 8:29 PM on 7/1/2022 to document services personally performed by Kasia Adamson MD based on my observations and the provider's statements to me.          Kasia Adamson MD  07/01/22 [id number]

## 2022-07-02 NOTE — PHARMACY-ADMISSION MEDICATION HISTORY
Admission medication history interview status for this patient is complete. See [facility] admission navigator for allergy information, prior to admission medications and immunization status.     Medication history interview done, indicate source(s): Patient  Medication history resources (including written lists, pill bottles, clinic record): SureScript and Care Everywhere  Pharmacy: [facility] in [place] on [address]    Changes made to PTA medication list:  Added: None  Changed: None  Reported as Not Taking: None  Removed: Oxycodone    Actions taken by pharmacist (provider contacted, etc):None     Additional medication history information: Patient is on course of Bactrim that she will be finishing while here at the hospital     Medication reconciliation/reorder completed by provider prior to medication history?  No      Prior to Admission medications    Medication Sig Last Dose Taking? Auth Provider Long Term End Date   acetaminophen (TYLENOL) 325 MG tablet Take 2 tablets (650 mg) by mouth every 4 hours as needed for other (mild pain) Past Week at Unknown time Yes Gopal Snowden MD No    aspirin (ASA) 325 MG EC tablet Take 1 tablet (325 mg) by mouth daily Past Week at Unknown time Yes Gopal Snowden MD     atorvastatin (LIPITOR) 10 MG tablet Take 10 mg by mouth every evening Past Week at Unknown time Yes Reported, Patient Yes    Calcium Carb-Cholecalciferol (CALCIUM + D3 PO) Take 1 tablet by mouth daily Past Week at Unknown time Yes Reported, Patient     Carboxymethylcellulose Sodium (THERATEARS OP) Place 1-2 drops into both eyes daily as needed Past Week at Unknown time Yes Reported, Patient     chlorthalidone (HYGROTON) 25 MG tablet Take 0.5 tablets (12.5 mg) by mouth daily (0.5 x 25 mg = 12.5 mg)  Do not take your chlorthalidone on discharge, monitor BP each morning at home and resume when systolic blood pressure (top number) is 130 or greater. If no blood pressure cuff available, have blood pressure checked at a  pharmacy or in PCP clinic in 2-3 days before resuming. Past Month at Unknown time Yes Jena Barakat PA-C Yes    famotidine (PEPCID) 20 MG tablet Take 20 mg by mouth daily Past Week at Unknown time Yes Unknown, Entered By History     fish oil-omega-3 fatty acids 1000 MG capsule Take 1 g by mouth daily  Past Week at Unknown time Yes Unknown, Entered By History     Glucosamine-Chondroitin (MOVE FREE PO) Take 1 tablet by mouth every morning Past Week at Unknown time Yes Reported, Patient     magnesium 250 MG tablet Take 1 tablet by mouth every evening  Past Week at Unknown time Yes Unknown, Entered By History     Melatonin 10 MG TABS tablet Take 10 mg by mouth nightly as needed for sleep Past Week at Unknown time Yes Reported, Patient     multivitamin w/minerals (THERA-VIT-M) tablet Take 1 tablet by mouth every morning Past Week at Unknown time Yes Reported, Patient     Probiotic Product (PROBIOTIC-10 PO) Take 1 capsule by mouth every morning Past Week at Unknown time Yes Reported, Patient     senna-docusate (SENOKOT-S/PERICOLACE) 8.6-50 MG tablet Take 1-2 tablets by mouth 2 times daily Take while on oral narcotics to prevent or treat constipation. Past Week at Unknown time Yes Gopal Snowden MD     vitamin D3 (CHOLECALCIFEROL) 1000 units (25 mcg) tablet Take 1,000 Units by mouth daily Past Week at Unknown time Yes Unknown, Entered By History     order for DME Equipment being ordered: [employer] () and Walker ()  Treatment Diagnosis: impaired gait   Gopal Snowden MD     order for DME Equipment being ordered: [employer] () and Walker ()  Treatment Diagnosis: Difficulty with gait   Gopal Snowden MD

## 2022-07-02 NOTE — PROGRESS NOTES
I saw and examiend this patient today    She is feeling better.  She ambulated in halls and did well    Ortho has seen pt and do not think she has a deep infection of her knee    Will discharge on PO Bactrim today

## 2022-07-02 NOTE — CONSULTS
[facility]    Orthopedic Consultation    Susanne Escoto MRN# [id number]   Age: 76 year old YOB: 1945     Date of Admission:  7/1/2022    Reason for consult: Left lower extremity redness and swelling, s/p left TKA 6/9/22       Requesting physician: Dr. Meyers                   Assessment and Plan:      1. S/P left total knee arthroplasty.    At this time, this patient is OK to be discharged home from an orthopedic standpoint. Based on patient's signs and symptoms, clinical exam and evaluation, our concern for a septic total joint or cellulitis is low. Lower extremity US was negative for DVT. We would recommend follow up for this patient with Dr. Lo who did her original surgery. The patient expressed understanding of when she should seek additional care for her knee. She was in agreement with our plan.                     Chief Complaint:     Left lower extremity redness and swelling; weakness and dizziness         History of Present Illness:   This patient is a 76 year old female who presents with the following condition requiring a hospital admission: Recurrent UTI (E. Coli ESBL) and concern for left lower extremity cellulitis.     She initially presented to the ED on 7/1 due to dizziness and weakness. She has a history of recurrent UTIs and CKD. Upon examination in the ED, she was found to have a swollen and erythematous left lower extremity. Considering her history of a recent left total knee arthroplasty she was admitted to the hospital for concern for a septic knee. At the time of her presentation, the patient reports she had no concerns regarding her knee. She denies worsening knee pain or swelling. She denies fever or chills. She reports it has overall been gradually been improving since her surgery. She is ambulating well without assistance. She has not been taking any medication for her knee. She is currently being followed by medicine for treatment of her UTI  with bactrim.           Past Medical History:     Past Medical History:   Diagnosis Date     Chronic kidney disease      Chronic knee pain      Contact dermatitis      GERD (gastroesophageal reflux disease)      Hyperlipidemia      Hypertension      Hypokalemia      Hyponatremia      Lung cancer (H)      Menopause      Osteoarthritis      Twitching      UTI (urinary tract infection)              Past Surgical History:     Past Surgical History:   Procedure Laterality Date     ARTHROPLASTY KNEE Right 2019    Procedure: RIGHT TOTAL KNEE ARTHROPLASTY;  Surgeon: Gopal Snowden MD;  Location: [facility] OR     ARTHROPLASTY KNEE Left 2022    Procedure: LEFT TOTAL KNEE ARTHROPLASTY;  Surgeon: Gopal Snowden MD;  Location: [facility] OR     AS ESOPHAGOSCOPY, DIAGNOSTIC       LUNG SURGERY       ORTHOPEDIC SURGERY      Right thumb cyst removal     THORACOTOMY  2014             Social History:     Social History     Tobacco Use     Smoking status: Former Smoker     Packs/day: 0.50     Years: 30.00     Pack years: 15.00     Quit date: 1993     Years since quittin.5     Smokeless tobacco: Never Used   Substance Use Topics     Alcohol use: Yes     Comment: 1/week             Family History:   No family history on file.          Immunizations:     VACCINE/DOSE   Diptheria   DPT   DTAP   HBIG   Hepatitis A   Hepatitis B   HIB   Influenza   Measles   Meningococcal   MMR   Mumps   Pneumococcal   Polio   Rubella   Small Pox   TDAP   Varicella   Zoster             Allergies:   No Known Allergies          Medications:     Current Facility-Administered Medications   Medication     acetaminophen (TYLENOL) tablet 650 mg    Or     acetaminophen (TYLENOL) Suppository 650 mg     aspirin (ASA) EC tablet 325 mg     melatonin tablet 1 mg     ondansetron (ZOFRAN ODT) ODT tab 4 mg    Or     ondansetron (ZOFRAN) injection 4 mg     senna-docusate (SENOKOT-S/PERICOLACE) 8.6-50 MG per tablet 1-2 tablet     sulfamethoxazole-trimethoprim  (BACTRIM DS) 800-160 MG per tablet 1 tablet     Current Outpatient Medications   Medication Sig     acetaminophen (TYLENOL) 325 MG tablet Take 2 tablets (650 mg) by mouth every 4 hours as needed for other (mild pain)     aspirin (ASA) 325 MG EC tablet Take 1 tablet (325 mg) by mouth daily     atorvastatin (LIPITOR) 10 MG tablet Take 10 mg by mouth every evening     Calcium Carb-Cholecalciferol (CALCIUM + D3 PO) Take 1 tablet by mouth daily     Carboxymethylcellulose Sodium (THERATEARS OP) Place 1-2 drops into both eyes daily as needed     chlorthalidone (HYGROTON) 25 MG tablet Take 0.5 tablets (12.5 mg) by mouth daily (0.5 x 25 mg = 12.5 mg)  Do not take your chlorthalidone on discharge, monitor BP each morning at home and resume when systolic blood pressure (top number) is 130 or greater. If no blood pressure cuff available, have blood pressure checked at a pharmacy or in PCP clinic in 2-3 days before resuming.     famotidine (PEPCID) 20 MG tablet Take 20 mg by mouth daily     fish oil-omega-3 fatty acids 1000 MG capsule Take 1 g by mouth daily      Glucosamine-Chondroitin (MOVE FREE PO) Take 1 tablet by mouth every morning     magnesium 250 MG tablet Take 1 tablet by mouth every evening      Melatonin 10 MG TABS tablet Take 10 mg by mouth nightly as needed for sleep     multivitamin w/minerals (THERA-VIT-M) tablet Take 1 tablet by mouth every morning     Probiotic Product (PROBIOTIC-10 PO) Take 1 capsule by mouth every morning     senna-docusate (SENOKOT-S/PERICOLACE) 8.6-50 MG tablet Take 1-2 tablets by mouth 2 times daily Take while on oral narcotics to prevent or treat constipation.     vitamin D3 (CHOLECALCIFEROL) 1000 units (25 mcg) tablet Take 1,000 Units by mouth daily     order for DME Equipment being ordered: Walker Wheels () and Walker ()  Treatment Diagnosis: impaired gait     order for DME Equipment being ordered: Walker Wheels () and Walker ()  Treatment Diagnosis: Difficulty with  gait             Review of Systems:   CV: NEGATIVE for chest pain, palpitations or peripheral edema  C: NEGATIVE for fever, chills, change in weight  E/M: NEGATIVE for ear, mouth and throat problems  R: NEGATIVE for significant cough or SOB  See HPI.           Physical Exam:   All vitals have been reviewed  Patient Vitals for the past 24 hrs:   BP Temp Temp src Pulse Resp SpO2   07/02/22 1400 121/67 97.6  F (36.4  C) Temporal 98 18 97 %   07/02/22 0744 129/72 97.5  F (36.4  C) Temporal 97 18 96 %   07/02/22 0418 136/71 98.2  F (36.8  C) Temporal 94 16 94 %   07/02/22 0100 117/76 98  F (36.7  C) Temporal 89 16 96 %   07/02/22 0045 (!) 123/91 -- -- 103 -- 95 %   07/02/22 0030 (!) 140/79 -- -- 95 -- 95 %   07/02/22 0015 139/85 -- -- 95 -- 94 %   07/02/22 0000 127/82 -- -- 96 -- 97 %   07/01/22 2345 134/80 -- -- -- -- --   07/01/22 2330 135/80 -- -- 100 -- --   07/01/22 2315 (!) 142/88 -- -- 99 -- 97 %   07/01/22 2300 (!) 155/93 -- -- 93 -- 97 %   07/01/22 2245 (!) 163/82 -- -- 94 -- 97 %   07/01/22 2230 (!) 162/96 -- -- 97 -- 98 %   07/01/22 2215 (!) 142/88 -- -- -- -- 95 %   07/01/22 2115 (!) 144/85 -- -- 83 -- 99 %   07/01/22 2030 (!) 142/95 -- -- 91 -- 98 %   07/01/22 2015 (!) 144/85 -- -- 85 -- 99 %   07/01/22 2000 (!) 146/89 -- -- 89 -- 99 %   07/01/22 1955 (!) 155/91 -- -- 89 -- --       Intake/Output Summary (Last 24 hours) at 7/2/2022 0926  Last data filed at 7/2/2022 0914  Gross per 24 hour   Intake 360 ml   Output 300 ml   Net 60 ml     GENERAL:  Pleasant, cooperative, alert. Well developed, well nourished.   MUSCULOSKELETAL:  Moving x 4 spontaneously with CMS intact x4. Radial pulses 2+ bilaterally.  LEFT LOWER EXTREMITY: Prineo dressing still intact over incision. Incision is c/d/i/. Knee appears edematous. There is some erythema surrounding her incision. No warmth. No tenderness to palpation. No pain with range of motion. No palpable effusion. Incision is c/d/i. Calf is supple, no pain upon palpation of  calf. No palpable cord.   NEURO: Alert and oriented x3.                  .             Data:   All laboratory data reviewed  Results for orders placed or performed during the hospital encounter of 07/01/22   US Lower Extremity Venous Duplex Left     Status: None    Narrative    EXAM: US LOWER EXTREMITY VENOUS DUPLEX LEFT  LOCATION: [facility]  DATE/TIME: 7/1/2022 9:21 PM    INDICATION: pain swelling redness  COMPARISON: None.  TECHNIQUE: Venous Duplex ultrasound of the left lower extremity with and without compression, augmentation and duplex. Color flow and spectral Doppler with waveform analysis performed.    FINDINGS: Exam includes the common femoral, femoral, popliteal, and contralateral common femoral veins as well as segmentally visualized deep calf veins and greater saphenous vein.     LEFT: No deep vein thrombosis. No superficial thrombophlebitis.      Impression    IMPRESSION:  1.  No deep venous thrombosis in the left lower extremity. 5.5 x 3.1 x 1.5 cm Baker's cyst left popliteal fossa.   Basic metabolic panel (BMP)     Status: Abnormal   Result Value Ref Range    Sodium 135 133 - 144 mmol/L    Potassium 4.0 3.4 - 5.3 mmol/L    Chloride 103 94 - 109 mmol/L    Carbon Dioxide (CO2) 29 20 - 32 mmol/L    Anion Gap 3 3 - 14 mmol/L    Urea Nitrogen 20 7 - 30 mg/dL    Creatinine 1.03 0.52 - 1.04 mg/dL    Calcium 9.5 8.5 - 10.1 mg/dL    Glucose 94 70 - 99 mg/dL    GFR Estimate 55 (L) >60 mL/min/1.73m2   [place] Draw     Status: None    Narrative    The following orders were created for panel order [place] Draw.  Procedure                               Abnormality         Status                     ---------                               -----------         ------                     Extra Blue Top Tube[[id number]]                              Final result               Extra Red Top Tube[[id number]]                               Final result               Extra Green Top  (Lithium...[[id number]]                      Final result                 Please view results for these tests on the individual orders.   CBC with platelets and differential     Status: Abnormal   Result Value Ref Range    WBC Count 7.9 4.0 - 11.0 10e3/uL    RBC Count 3.83 3.80 - 5.20 10e6/uL    Hemoglobin 11.6 (L) 11.7 - 15.7 g/dL    Hematocrit 36.5 35.0 - 47.0 %    MCV 95 78 - 100 fL    MCH 30.3 26.5 - 33.0 pg    MCHC 31.8 31.5 - 36.5 g/dL    RDW 13.7 10.0 - 15.0 %    Platelet Count 462 (H) 150 - 450 10e3/uL    % Neutrophils 47 %    % Lymphocytes 31 %    % Monocytes 14 %    % Eosinophils 5 %    % Basophils 2 %    % Immature Granulocytes 1 %    NRBCs per 100 WBC 0 <1 /100    Absolute Neutrophils 3.8 1.6 - 8.3 10e3/uL    Absolute Lymphocytes 2.4 0.8 - 5.3 10e3/uL    Absolute Monocytes 1.1 0.0 - 1.3 10e3/uL    Absolute Eosinophils 0.4 0.0 - 0.7 10e3/uL    Absolute Basophils 0.1 0.0 - 0.2 10e3/uL    Absolute Immature Granulocytes 0.1 <=0.4 10e3/uL    Absolute NRBCs 0.0 10e3/uL   Extra Blue Top Tube     Status: None   Result Value Ref Range    Hold Specimen JIC    Extra Red Top Tube     Status: None   Result Value Ref Range    Hold Specimen JIC    Extra Green Top (Lithium Heparin) ON ICE     Status: None   Result Value Ref Range    Hold Specimen JIC    UA with Microscopic reflex to Culture     Status: Abnormal    Specimen: Urine, Clean Catch   Result Value Ref Range    Color Urine Straw Colorless, Straw, Light Yellow, Yellow    Appearance Urine Clear Clear    Glucose Urine Negative Negative mg/dL    Bilirubin Urine Negative Negative    Ketones Urine Negative Negative mg/dL    Specific Gravity Urine 1.008 1.003 - 1.035    Blood Urine Small (A) Negative    pH Urine 6.0 5.0 - 7.0    Protein Albumin Urine Negative Negative mg/dL    Urobilinogen Urine Normal Normal, 2.0 mg/dL    Nitrite Urine Negative Negative    Leukocyte Esterase Urine Trace (A) Negative    Bacteria Urine Few (A) None Seen /HPF    RBC Urine 3 (H) <=2 /HPF     WBC Urine 4 <=5 /HPF    Squamous Epithelials Urine 1 <=1 /HPF    Narrative    Urine Culture not indicated   [place] Draw     Status: None    Narrative    The following orders were created for panel order [place] Draw.  Procedure                               Abnormality         Status                     ---------                               -----------         ------                     Extra Green Top (Lithium...[[id number]]                      Final result                 Please view results for these tests on the individual orders.   Extra Green Top (Lithium Heparin) ON ICE     Status: None   Result Value Ref Range    Hold Specimen JIC    Asymptomatic COVID-19 Virus (Coronavirus) by PCR Nasopharyngeal     Status: Normal    Specimen: Nasopharyngeal; Swab   Result Value Ref Range    SARS CoV2 PCR Negative Negative    Narrative    Testing was performed using the Xpert Xpress SARS-CoV-2 Assay on the   Cepheid Gene-Xpert Instrument Systems. Additional information about   this Emergency Use Authorization (EUA) assay can be found via the Lab   Guide. This test should be ordered for the detection of SARS-CoV-2 in   individuals who meet SARS-CoV-2 clinical and/or epidemiological   criteria. Test performance is unknown in asymptomatic patients. This   test is for in vitro diagnostic use under the FDA EUA for   laboratories certified under CLIA to perform high complexity testing.   This test has not been FDA cleared or approved. A negative result   does not rule out the presence of PCR inhibitors in the specimen or   target RNA in concentration below the limit of detection for the   assay. The possibility of a false negative should be considered if   the patient's recent exposure or clinical presentation suggests   COVID-19. This test was validated by the [facility]. This laboratory is certified under the Clinical Laboratory Improvement Amendments of 1988 (CLIA-88) as qualified to  perform high complexity laboratory testing.     Basic metabolic panel     Status: Abnormal   Result Value Ref Range    Sodium 136 133 - 144 mmol/L    Potassium 4.1 3.4 - 5.3 mmol/L    Chloride 106 94 - 109 mmol/L    Carbon Dioxide (CO2) 24 20 - 32 mmol/L    Anion Gap 6 3 - 14 mmol/L    Urea Nitrogen 13 7 - 30 mg/dL    Creatinine 1.02 0.52 - 1.04 mg/dL    Calcium 9.1 8.5 - 10.1 mg/dL    Glucose 90 70 - 99 mg/dL    GFR Estimate 56 (L) >60 mL/min/1.73m2   CRP inflammation     Status: Abnormal   Result Value Ref Range    CRP Inflammation 73.3 (H) 0.0 - 8.0 mg/L   Erythrocyte sedimentation rate auto     Status: Abnormal   Result Value Ref Range    Erythrocyte Sedimentation Rate 60 (H) 0 - 30 mm/hr   CBC with platelets and differential     Status: Abnormal   Result Value Ref Range    WBC Count 6.9 4.0 - 11.0 10e3/uL    RBC Count 3.67 (L) 3.80 - 5.20 10e6/uL    Hemoglobin 11.0 (L) 11.7 - 15.7 g/dL    Hematocrit 34.7 (L) 35.0 - 47.0 %    MCV 95 78 - 100 fL    MCH 30.0 26.5 - 33.0 pg    MCHC 31.7 31.5 - 36.5 g/dL    RDW 13.6 10.0 - 15.0 %    Platelet Count 475 (H) 150 - 450 10e3/uL    % Neutrophils 59 %    % Lymphocytes 22 %    % Monocytes 13 %    % Eosinophils 5 %    % Basophils 1 %    % Immature Granulocytes 0 %    NRBCs per 100 WBC 0 <1 /100    Absolute Neutrophils 4.0 1.6 - 8.3 10e3/uL    Absolute Lymphocytes 1.5 0.8 - 5.3 10e3/uL    Absolute Monocytes 0.9 0.0 - 1.3 10e3/uL    Absolute Eosinophils 0.4 0.0 - 0.7 10e3/uL    Absolute Basophils 0.1 0.0 - 0.2 10e3/uL    Absolute Immature Granulocytes 0.0 <=0.4 10e3/uL    Absolute NRBCs 0.0 10e3/uL   EKG 12 lead     Status: None (Preliminary result)   Result Value Ref Range    Systolic Blood Pressure  mmHg    Diastolic Blood Pressure  mmHg    Ventricular Rate 96 BPM    Atrial Rate 96 BPM    HI Interval 160 ms    QRS Duration 78 ms     ms    QTc 439 ms    P Axis 73 degrees    R AXIS -6 degrees    T Axis 30 degrees    Interpretation ECG       Sinus rhythm  Possible Left  atrial enlargement  Borderline ECG  When compared with ECG of 10-JUL-2006 11:25,  Vent. rate has increased BY  35 BPM     CBC + differential     Status: Abnormal    Narrative    The following orders were created for panel order CBC + differential.  Procedure                               Abnormality         Status                     ---------                               -----------         ------                     CBC with platelets and d...[[id number]]  Abnormal            Final result                 Please view results for these tests on the individual orders.   CBC with platelets differential *Canceled*     Status: None ()    Narrative    The following orders were created for panel order CBC with platelets differential.  Procedure                               Abnormality         Status                     ---------                               -----------         ------                     CBC with platelets and d...[[id number]]                                                   Please view results for these tests on the individual orders.   CBC with platelets differential     Status: Abnormal    Narrative    The following orders were created for panel order CBC with platelets differential.  Procedure                               Abnormality         Status                     ---------                               -----------         ------                     CBC with platelets and d...[[id number]]  Abnormal            Final result                 Please view results for these tests on the individual orders.          Attestation:  I have reviewed today's vital signs, notes, medications, labs and imaging with Dr. Shannon.      Michi Guy PA-C

## 2022-07-02 NOTE — PLAN OF CARE
"PRIMARY DIAGNOSIS: SOFT TISSUE INFECTIONS/CELLULITIS   OUTPATIENT/OBSERVATION GOALS TO BE MET BEFORE DISCHARGE:  1. Vitals sign stable or return to baseline: Yes    2. Tolerating oral antibiotics or has home infusion set up if applicable: Yes    3. Pain status: Improved-controlled with oral pain medications.    4. Return to near baseline physical activity: Yes    Discharge Planner Nurse   Safe discharge environment identified: No  Barriers to discharge: Yes       Entered by: Sabra Daniels 07/02/2022 2:15 PM     Please review provider order for any additional goals.     Nurse to notify provider when observation goals have been met and patient is ready for discharge.    /67 (BP Location: Right arm, Cuff Size: Adult Regular)   Pulse 98   Temp 97.6  F (36.4  C) (Temporal)   Resp 18   Ht 1.6 m (5' 3\")   Wt 92.1 kg (203 lb 0.7 oz)   SpO2 97%   BMI 35.97 kg/m        AOx4, SBA g/w, mild redness around L knee surgical incision as well as right ankle, +1-2 generalized edema BLE, erythema around right knee cellulitis is improving, post-op aspirin re-ordered, contact precautions maintained. Ambulated in halls, tolerated well. Ortho gave ok to discharge, surgeon indicated lack of surgical knee infection. Will discharge this afternoon.         Reviewed discharge instructions and medications with patient and son. Questions answered. Patient discharged to home with daughter, sent with discharge instructions and belongings.      "

## 2022-07-02 NOTE — ED NOTES
[facility]  ED Nurse Handoff Report    Susanne Escoto is a 76 year old female   ED Chief complaint: Dizziness and Generalized Weakness  . ED Diagnosis:   Final diagnoses:   Cellulitis of left lower extremity   Generalized muscle weakness   Lightheadedness     Allergies: No Known Allergies    Code Status: Full Code  Activity level - Baseline/Home:  Independent. Activity Level - Current:   Stand by Assist. Lift room needed: No. Bariatric: No   Needed: No   Isolation: Yes. Infection: Not Applicable.     Vital Signs:   Vitals:    07/01/22 2030 07/01/22 2115 07/01/22 2215 07/01/22 2230   BP: (!) 142/95 (!) 144/85 (!) 142/88 (!) 162/96   Pulse: 91 83  97   Resp:       Temp:       TempSrc:       SpO2: 98% 99% 95% 98%   Weight:       Height:           Cardiac Rhythm:  ,      Pain level:    Patient confused: No. Patient Falls Risk: Yes.   Elimination Status: Has voided   Patient Report - Initial Complaint: dizziness, weakness. Focused Assessment: respiratory, cardiac, peripheral neurovascular   Tests Performed:   Labs Ordered and Resulted from Time of ED Arrival to Time of ED Departure   BASIC METABOLIC PANEL - Abnormal       Result Value    Sodium 135      Potassium 4.0      Chloride 103      Carbon Dioxide (CO2) 29      Anion Gap 3      Urea Nitrogen 20      Creatinine 1.03      Calcium 9.5      Glucose 94      GFR Estimate 55 (*)    CBC WITH PLATELETS AND DIFFERENTIAL - Abnormal    WBC Count 7.9      RBC Count 3.83      Hemoglobin 11.6 (*)     Hematocrit 36.5      MCV 95      MCH 30.3      MCHC 31.8      RDW 13.7      Platelet Count 462 (*)     % Neutrophils 47      % Lymphocytes 31      % Monocytes 14      % Eosinophils 5      % Basophils 2      % Immature Granulocytes 1      NRBCs per 100 WBC 0      Absolute Neutrophils 3.8      Absolute Lymphocytes 2.4      Absolute Monocytes 1.1      Absolute Eosinophils 0.4      Absolute Basophils 0.1      Absolute Immature Granulocytes 0.1      Absolute  NRBCs 0.0     ROUTINE UA WITH MICROSCOPIC REFLEX TO CULTURE - Abnormal    Color Urine Straw      Appearance Urine Clear      Glucose Urine Negative      Bilirubin Urine Negative      Ketones Urine Negative      Specific Gravity Urine 1.008      Blood Urine Small (*)     pH Urine 6.0      Protein Albumin Urine Negative      Urobilinogen Urine Normal      Nitrite Urine Negative      Leukocyte Esterase Urine Trace (*)     Bacteria Urine Few (*)     RBC Urine 3 (*)     WBC Urine 4      Squamous Epithelials Urine 1     COVID-19 VIRUS (CORONAVIRUS) BY PCR     US Lower Extremity Venous Duplex Left   Final Result   IMPRESSION:   1.  No deep venous thrombosis in the left lower extremity. 5.5 x 3.1 x 1.5 cm Baker's cyst left popliteal fossa.        . Abnormal Results: see above.   Treatments provided: see MAR  Family Comments: daughter bedside  OBS brochure/video discussed/provided to patient:  Yes  ED Medications:   Medications   0.9% sodium chloride BOLUS (1,000 mLs Intravenous New Bag 7/1/22 2209)     Followed by   sodium chloride 0.9% infusion (has no administration in time range)   cefTRIAXone (ROCEPHIN) 2 g vial to attach to  ml bag for ADULTS or NS 50 ml bag for PEDS (2 g Intravenous New Bag 7/1/22 5171)   sulfamethoxazole-trimethoprim (BACTRIM DS) 800-160 MG per tablet 1 tablet (1 tablet Oral Given 7/1/22 1805)     Drips infusing:  Yes  For the majority of the shift, the patient's behavior Green. Interventions performed were n/a.    Sepsis treatment initiated: No     Patient tested for COVID 19 prior to admission: YES    ED Nurse Name/Phone Number: Coty Mancuso RN,   11:53 PM  RECEIVING UNIT ED HANDOFF REVIEW    Above ED Nurse Handoff Report was reviewed: Yes  Reviewed by: Debra Tejada RN on July 2, 2022 at 1:05 AM

## 2022-07-02 NOTE — PLAN OF CARE
Transferred to unit around 1 am. Pt is alert and oriented x4, SBA for transfers with walker and gb, pt c/o weakness and dizziness, redness around L knee surgical incision, marked, incision glued, DOLORES, pt has edema to bilat LE, on Bactrim bid, on contact precautions for ESBL in urine, NS running at 100 ml/hr, regular diet, Tylenol given for headache, effective.

## 2022-07-02 NOTE — H&P
Admitted: 07/01/2022    CHIEF COMPLAINT:  Weakness, dizziness.    HISTORY OF PRESENT ILLNESS:  History was obtained from the patient.  This is a 76-year-old white female with a history of CKD stage III, hypertension, recurrent UTIs, who was recently status post left total knee arthroplasty carried out on 06/09/2022.  Since then, she has been feeling dizzy and unsteady on her feet.  She has not had any falls.  She had some urinary symptoms and went to emergency room on Monday and when she had a fever.  She was prescribed antibiotics, her symptoms have since resolved; however, her dizziness has persisted and weakness has persisted and hence, she came into the ER.  She has had nausea, but denies any emesis.  She has had good oral intake.  She denies any chest pain or shortness of breath.  In the ED over here, she is seen by Dr. Adamson.  I discussed care with her.  I am asked to admit her for further evaluation.    PAST MEDICAL HISTORY:  Recurrent UTIs.  CKD stage III.  Osteoarthritis.  Hypertension.  Hyperlipidemia.    PAST SURGICAL HISTORY:  Significant for bilateral knee replacement, lung lobectomy, lipoma resection.    FAMILY HISTORY:  Significant for liver cancer in her father.    HOME MEDICATIONS:  List awaits reconciliation, but includes:    1.  Lipitor.  2.  Aspirin.  3.  Macrobid.  4.  Bactrim.    ALLERGIES:  NO KNOWN DRUG ALLERGIES.    REVIEW OF SYSTEMS:  As mentioned in the HPI.  All other systems are reviewed and deemed unremarkable and negative.    PHYSICAL EXAMINATION:    VITAL SIGNS:  Her temperature is 97.6, pulse is 97, blood pressure is 162/96, respiratory rate 16, O2 sat is 98% on room air.  GENERAL:  She is alert, awake, oriented, coherent, nontoxic, in no acute distress.  HEENT:  Pupils equal, round, reactive to light.  LUNGS:  Clear to auscultation bilaterally.  HEART:  Regular rate.  S1, S2 normal.  No murmurs or gallops.  ABDOMEN:  Soft, nontender, nondistended with good bowel  sounds.  EXTREMITIES:  She has 2+ edema.  She has erythema.  Warmth of her left knee, minimally tender to palpation.  Her incision site is not draining.  SKIN:  There is no evidence of wound dehiscence.    NEUROLOGICAL:  Cranial nerves II through XII are grossly within normal limits.  She has good strength in her extremities.    LABORATORY DATA:  Obtained here included a basic metabolic panel which is grossly unremarkable other than a creatinine of 5.5.  CBC with diff is grossly unremarkable.  Urinalysis shows trace leukocyte esterase with 4 WBCs.      Ultrasound of her lower extremities showed no DVT, but did show a 5.5 x 3.1 x 1.5 cm Baker's cyst, left popliteal fossa.      EKG obtained on her showed the following:  Sinus rhythm at 96 beats per minute.    ASSESSMENT AND PLAN:    1.  Probable cellulitis of the left lower extremity in the setting of recent left total knee arthroplasty.    We will admit her as under observation status.  We will treat her with  antibiotics.  We will have Orthopedics evaluate her.  - I think bactrim is a good choice given e.coli which was ESBL in urine would be covered as well.    2.  Recent urinary tract infection.    Her cultures grew E. coli, which is ESBL.  We will provide her antibiotics for that.    3.  Hypertension.    She will need resumption of her home medications once reconciled.    CODE STATUS:  Full code.    She will be admitted under observation status.    Nilton Meyers MD        D: 2022   T: 2022   MT: [email or web address]    Name:     CURTIS CONNELLY  MRN:      [id number]        Account:     [id number]   :      1945           Admitted:    2022       Document: [id number]

## 2022-07-02 NOTE — DISCHARGE SUMMARY
Service Date: 07/01/2022  Discharge Date: 07/02/2022    PRINCIPAL FINAL DIAGNOSES:    1.  Possible superficial cellulitis of left lower extremity in the setting of recent left total knee arthroplasty.  2.  Recent urinary tract infection with urine culture growing E. coli that was ESBL.  The patient was treated with outpatient antibiotics.  Repeat urinalysis this admission showed resolution of urinary tract infection.  3.  Hypertension history.  4.  Recent left total knee arthroplasty performed 06/09/2022.  5.  History of chronic kidney disease, stage III.    PRINCIPAL PROCEDURES THIS ADMISSION:     1.  COVID-19 that was negative.  2.  Oral antibiotics.  3.  Orthopedics consultation.    REASON FOR ADMISSION:  Please see dictated history and physical.  In brief, Ms. Escoto is a 76-year-old female with the above medical history, who presented to the hospital for concerns about generalized weakness and lightheadedness.  Please see dictated history and physical for details.  She was recently diagnosed with a UTI and was receiving outpatient antibiotic therapy with Macrobid.    HOSPITAL COURSE:  Possible cellulitis near the area of incision for recent left total knee arthroplasty.  On presentation, the patient was noted to have some discoloration around the incision site of her left TKA.  She denies any drainage from the wound itself.  Area was outlined and remained stable while in the hospital.  The patient was seen by Orthopedic Surgery.  There was not felt to be a deep infection present.  Possible she had a superficial cellulitis, and plans are for discharge on the Bactrim.  She did receive Bactrim for treatment of a recent UTI, but she did not complete the antibiotics.  She was instructed to complete her previous antibiotic therapy for another 3 days.  It is unclear if the discoloration surrounding her left knee was truly infectious versus some bruising related to her recent surgery.  She did not appear septic or  toxic, had no fevers, and had a normal white blood cell count.    She appears stable for discharge from the hospital to home today.    DISCHARGE MEDICATIONS:    1.  Acetaminophen as needed.  2.  Aspirin 325 mg daily.  3.  Atorvastatin 10 mg every evening.  4.  Calcium with vitamin D.  5.  Chlorthalidone 12.5 mg daily.  6.  Pepcid 20 mg daily.  7.  Fish oil.  8.  Magnesium.  9.  Melatonin.  10.  Multivitamin.  11.  Probiotic.  12.  Senokot.  13.  Vitamin D3.  14.  Bactrim 1 tablet twice a day for the next 3 days.    FOLLOWUP INSTRUCTIONS:  If you develop worsening redness or drainage from your knee, contact orthopedic provider.    I examined this patient on the day of discharge.    Mirza Guzman MD        D: 2022   T: 2022   MT: ANNABEL    Name:     CURTIS CONNELLY  MRN:      [id number]        Account:      [id number]   :      1945           Service Date: 2022                                  Discharge Date: 2022     Document: [id number]

## 2022-07-05 ENCOUNTER — PATIENT OUTREACH (OUTPATIENT)
Dept: CARE COORDINATION | Facility: CLINIC | Age: 77
End: 2022-07-05

## 2022-07-05 DIAGNOSIS — Z71.89 OTHER SPECIFIED COUNSELING: ICD-10-CM

## 2022-07-05 LAB
ATRIAL RATE - MUSE: 96 BPM
DIASTOLIC BLOOD PRESSURE - MUSE: NORMAL MMHG
INTERPRETATION ECG - MUSE: NORMAL
P AXIS - MUSE: 73 DEGREES
PR INTERVAL - MUSE: 160 MS
QRS DURATION - MUSE: 78 MS
QT - MUSE: 348 MS
QTC - MUSE: 439 MS
R AXIS - MUSE: -6 DEGREES
SYSTOLIC BLOOD PRESSURE - MUSE: NORMAL MMHG
T AXIS - MUSE: 30 DEGREES
VENTRICULAR RATE- MUSE: 96 BPM

## 2022-07-05 NOTE — PROGRESS NOTES
"Clinic Care Coordination Contact  [facility]: Post-Discharge Note  SITUATION                                                      Admission:    Admission Date: 07/01/22   Reason for Admission: Lightheadedness, weakness  Discharge:   Discharge Date: 07/02/22  Discharge Diagnosis: Lightheadedness, weakness    BACKGROUND                                                      Per hospital discharge summary and inpatient provider notes:    This is a 76-year-old white female with a history of CKD stage III, hypertension, recurrent UTIs, who was recently status post left total knee arthroplasty carried out on 06/09/2022.  Since then, she has been feeling dizzy and unsteady on her feet.  She has not had any falls.  She had some urinary symptoms and went to emergency room on Monday and when she had a fever.  She was prescribed antibiotics, her symptoms have since resolved; however, her dizziness has persisted and weakness has persisted and hence, she came into the ER.  She has had nausea, but denies any emesis.  She has had good oral intake.  She denies any chest pain or shortness of breath.  In the ED over here, she is seen by Dr. Adamson.  I discussed care with her.  I am asked to admit her for further evaluation.    ASSESSMENT      Enrollment  Primary Care Care Coordination Status: Not a Candidate    Discharge Assessment  How are you doing now that you are home?: \"I'm doing good, I'm just trying to move around more\"  How are your symptoms? (Red Flag symptoms escalate to triage hotline per guidelines): Improved  Do you feel your condition is stable enough to be safe at home until your provider visit?: Yes  Does the patient have their discharge instructions? : Yes  Does the patient have questions regarding their discharge instructions? : No  Were you started on any new medications or were there changes to any of your previous medications? : Yes  Does the patient have all of their medications?: Yes  Do you have " questions regarding any of your medications? : No  Do you have all of your needed medical supplies or equipment (DME)?  (i.e. oxygen tank, CPAP, cane, etc.): Yes  Discharge follow-up appointment scheduled within 14 calendar days? : No  Is patient agreeable to assistance with scheduling? : No                  PLAN                                                      Outpatient Plan:  Take your recently prescribed Bactrim until it is gone. This medication will treat any potential superficial skin infection  surrounding your knee.  If you develop worsening redness or drainage from your knee, contact your orthopedic provider    Future Appointments   Date Time Provider Department Center   7/6/2022 11:20 AM Mina Dooely, PT [facility]   7/8/2022 10:00 AM Yvette Black, PT [facility]   7/13/2022  2:00 PM Jorge Ortiz, PT [facility]   7/15/2022  1:50 PM Yvette Black, PT [facility]   7/18/2022 11:20 AM Mnia Dooley, PT [facility]   7/22/2022  1:10 PM Yvette Black, PT [facility]   7/26/2022  2:30 PM Guy Nj, PT [facility]   7/29/2022  1:50 PM Yvette Black, PT [facility]         For any urgent concerns, please contact our 24 hour nurse triage line: [phone] ([address] [place] [address])         Mirian Fallon  Community Health Worker  [facility]  [phone]

## 2022-07-06 ENCOUNTER — THERAPY VISIT (OUTPATIENT)
Dept: PHYSICAL THERAPY | Facility: CLINIC | Age: 77
End: 2022-07-06
Payer: MEDICARE

## 2022-07-06 DIAGNOSIS — M25.562 ACUTE PAIN OF LEFT KNEE: Primary | ICD-10-CM

## 2022-07-06 DIAGNOSIS — Z47.89 AFTERCARE FOLLOWING SURGERY OF THE MUSCULOSKELETAL SYSTEM: ICD-10-CM

## 2022-07-06 PROCEDURE — 97110 THERAPEUTIC EXERCISES: CPT | Mod: GP | Performed by: PHYSICAL THERAPIST

## 2022-07-06 PROCEDURE — 97112 NEUROMUSCULAR REEDUCATION: CPT | Mod: GP | Performed by: PHYSICAL THERAPIST

## 2022-07-06 PROCEDURE — 97530 THERAPEUTIC ACTIVITIES: CPT | Mod: GP | Performed by: PHYSICAL THERAPIST

## 2022-07-08 ENCOUNTER — THERAPY VISIT (OUTPATIENT)
Dept: PHYSICAL THERAPY | Facility: CLINIC | Age: 77
End: 2022-07-08
Payer: MEDICARE

## 2022-07-08 DIAGNOSIS — M25.562 ACUTE PAIN OF LEFT KNEE: Primary | ICD-10-CM

## 2022-07-08 DIAGNOSIS — Z47.89 AFTERCARE FOLLOWING SURGERY OF THE MUSCULOSKELETAL SYSTEM: ICD-10-CM

## 2022-07-08 PROCEDURE — 97110 THERAPEUTIC EXERCISES: CPT | Mod: GP | Performed by: PHYSICAL THERAPIST

## 2022-07-08 PROCEDURE — 97530 THERAPEUTIC ACTIVITIES: CPT | Mod: GP | Performed by: PHYSICAL THERAPIST

## 2022-07-13 ENCOUNTER — THERAPY VISIT (OUTPATIENT)
Dept: PHYSICAL THERAPY | Facility: CLINIC | Age: 77
End: 2022-07-13
Payer: MEDICARE

## 2022-07-13 DIAGNOSIS — Z47.89 AFTERCARE FOLLOWING SURGERY OF THE MUSCULOSKELETAL SYSTEM: ICD-10-CM

## 2022-07-13 DIAGNOSIS — M25.562 ACUTE PAIN OF LEFT KNEE: Primary | ICD-10-CM

## 2022-07-13 PROCEDURE — 97110 THERAPEUTIC EXERCISES: CPT | Mod: GP | Performed by: PHYSICAL THERAPIST

## 2022-07-13 PROCEDURE — 97112 NEUROMUSCULAR REEDUCATION: CPT | Mod: GP | Performed by: PHYSICAL THERAPIST

## 2022-07-15 ENCOUNTER — THERAPY VISIT (OUTPATIENT)
Dept: PHYSICAL THERAPY | Facility: CLINIC | Age: 77
End: 2022-07-15
Payer: MEDICARE

## 2022-07-15 DIAGNOSIS — Z47.89 AFTERCARE FOLLOWING SURGERY OF THE MUSCULOSKELETAL SYSTEM: ICD-10-CM

## 2022-07-15 DIAGNOSIS — M25.562 ACUTE PAIN OF LEFT KNEE: Primary | ICD-10-CM

## 2022-07-15 PROCEDURE — 97110 THERAPEUTIC EXERCISES: CPT | Mod: GP | Performed by: PHYSICAL THERAPIST

## 2022-07-15 PROCEDURE — 97530 THERAPEUTIC ACTIVITIES: CPT | Mod: GP | Performed by: PHYSICAL THERAPIST

## 2022-07-18 ENCOUNTER — THERAPY VISIT (OUTPATIENT)
Dept: PHYSICAL THERAPY | Facility: CLINIC | Age: 77
End: 2022-07-18
Payer: MEDICARE

## 2022-07-18 DIAGNOSIS — M25.562 ACUTE PAIN OF LEFT KNEE: Primary | ICD-10-CM

## 2022-07-18 DIAGNOSIS — Z47.89 AFTERCARE FOLLOWING SURGERY OF THE MUSCULOSKELETAL SYSTEM: ICD-10-CM

## 2022-07-18 PROCEDURE — 97530 THERAPEUTIC ACTIVITIES: CPT | Mod: GP | Performed by: PHYSICAL THERAPIST

## 2022-07-18 PROCEDURE — 97110 THERAPEUTIC EXERCISES: CPT | Mod: GP | Performed by: PHYSICAL THERAPIST

## 2022-07-22 ENCOUNTER — THERAPY VISIT (OUTPATIENT)
Dept: PHYSICAL THERAPY | Facility: CLINIC | Age: 77
End: 2022-07-22
Payer: MEDICARE

## 2022-07-22 DIAGNOSIS — Z47.89 AFTERCARE FOLLOWING SURGERY OF THE MUSCULOSKELETAL SYSTEM: ICD-10-CM

## 2022-07-22 DIAGNOSIS — M25.562 ACUTE PAIN OF LEFT KNEE: Primary | ICD-10-CM

## 2022-07-22 PROCEDURE — 97110 THERAPEUTIC EXERCISES: CPT | Mod: GP | Performed by: PHYSICAL THERAPIST

## 2022-07-22 PROCEDURE — 97530 THERAPEUTIC ACTIVITIES: CPT | Mod: GP | Performed by: PHYSICAL THERAPIST

## 2022-07-29 ENCOUNTER — THERAPY VISIT (OUTPATIENT)
Dept: PHYSICAL THERAPY | Facility: CLINIC | Age: 77
End: 2022-07-29
Payer: MEDICARE

## 2022-07-29 DIAGNOSIS — M25.562 ACUTE PAIN OF LEFT KNEE: Primary | ICD-10-CM

## 2022-07-29 DIAGNOSIS — Z47.89 AFTERCARE FOLLOWING SURGERY OF THE MUSCULOSKELETAL SYSTEM: ICD-10-CM

## 2022-07-29 PROCEDURE — 97530 THERAPEUTIC ACTIVITIES: CPT | Mod: GP | Performed by: PHYSICAL THERAPIST

## 2022-07-29 PROCEDURE — 97110 THERAPEUTIC EXERCISES: CPT | Mod: GP | Performed by: PHYSICAL THERAPIST

## 2022-07-29 NOTE — LETTER
[facility] [place]  [address]  [facility] [address]  [phone]    2022    Re: Susanne Escoto   :   1945  MRN:  [id number]   REFERRING PHYSICIAN:   Melanie CARRASCO [facility]  Date of Initial Evaluation:  22  Visits:  Rxs Used: 12  Reason for Referral: Acute pain of left knee; Aftercare following surgery of the musculoskeletal system    PROGRESS  REPORT  Progress reporting period is from IE to 22.       SUBJECTIVE  Subjective changes noted by patient:   Reports overall is better. Still having stiffness, especially after prolonged sitting. Still  difficulty with doing stairs, not able to do reciprocally with ease, needs to rely on rail to help pull up, or do one at a time.Descending stairs more difficult. Walking is going good. Reports minimal to no pain, just stiffness. Has follow up with MD on 22.    Current Pain level: 0/10.     Initial Pain level: 3/10.   Changes in function:  Yes (See Goal flowsheet attached for changes in current functional level)  Adverse reaction to treatment or activity: None    OBJECTIVE  Changes noted in objective findings:   Objective: Left knee AROM 0-121; PROM 125.Can ascend 6 inch step with increased UE assist; unable to descend without increased substitution at hip. Ambulates with good gait pattern without assistive device.       ASSESSMENT/PLAN  Updated problem list and treatment plan: Diagnosis 1:  S/p left TKA  Decreased ROM/flexibility - manual therapy, therapeutic exercise and home program  Decreased joint mobility - manual therapy, therapeutic exercise and home program  Decreased strength - therapeutic exercise, therapeutic activities and home program  Impaired balance - neuro re-education, therapeutic activities and home program  Impaired gait - gait training and home program  STG/LTGs have been met or progress has been made  towards goals:  Yes (See Goal flow sheet completed today.)  Assessment of Progress: The patient's condition is improving.  Patient is meeting short term goals and is progressing towards long term goals.  Self Management Plans:  Patient has been instructed in a home treatment program.  Patient  has been instructed in self management of symptoms.  I have re-evaluated this patient and find that the nature, scope, duration and intensity of the therapy is appropriate for the medical condition of the patient.  Susanne continues to require the following intervention to meet STG and LTG's:  PT        Re: Susanne M Jevon   :   1945          Recommendations:  This patient would benefit from continued therapy.     Frequency:  1 X week, once daily  Duration:  for 8 weeks      Thank you for your referral.    INQUIRIES  Therapist: Yvette Black PT   [facility] SERVICES [address] [address]  Phone: [phone]  Fax: [phone]

## 2022-07-29 NOTE — PROGRESS NOTES
[facility]    OUTPATIENT Physical Therapy ORTHOPEDIC EVALUATION  PLAN OF TREATMENT FOR OUTPATIENT REHABILITATION  (COMPLETE FOR INITIAL CLAIMS ONLY)  Patient's Last Name, First Name, M.I.  YOB: 1945  Susanne Escoto    Provider s Name:  [facility]   Medical Record No.  [id number]   Start of Care Date:  06/14/22   Onset Date:  06/09/22    Type:     _X__PT   ___OT Medical Diagnosis:    Encounter Diagnoses   Name Primary?    Acute pain of left knee Yes    Aftercare following surgery of the musculoskeletal system         Treatment Diagnosis:  Left TKA        Goals:     07/29/22 0500   Body Part   Goals listed below are for Left TKA   Goal #1   Goal #1 ambulation   Previous Functional Level No restrictions   Current Functional Level Blocks patient can walk   Performance Level 1   STG Target Performance [place]ocks patient will be able to  walk   Performance Level 6-9 c SEC   Rationale for safe household ambulation;for safe outdoor household ambulation;for safe community ambulation;for safe work place ambulation   Due Date 07/12/22   Date Goal Met 07/29/22    LTG Target Performance [place] patient will be able to  walk   Performance Level 1.5   Rationale for safe outdoor household ambulation;for safe community ambulation;for safe work place ambulation;to maintain proper body mechanics/posture while ambulating to avoid additional compensatory injury due to improper gait mechanics;to promote a healthy and active lifestyle   Due Date 09/23/22   If goal not met, Why? continues to make progress, updated POC today, timeframe adjusted for goal   Goal #2   Goal #2 stairs   Previous Functional Level Ascend/descend stairs;in a normal reciprocal pattern   Current Functional Level Ascend/descend stairs;one step at a time;with a railing   Performance Level difficulty with descending  stair, does one at a time.   STG Target Performance Ascend stairs;in a normal reciprocal pattern;with a railing   Performance Level ascend reciprocally with railing; descend one step at a time with railing   Rationale to enter/leave the house safely;to reach upper level of home safely;to reach lower level of home safely;for safe community access to buildings;for safe community ambulaton   Due Date 08/26/22   LTG Target Performance Ascend/descend stairs;in a normal reciprocal pattern;with railing;Ascend/descend curb   Performance Level good reciprocal pattern with railing   Rationale to enter/leave the house safely;to reach upper level of home safely;to reach lower level of home safely;for safe community access to buildings;for safe community ambulaton   Due Date 09/23/22       Therapy Frequency:  1x/week  Predicted Duration of Therapy Intervention:  8 weeks    Yvette Black, PT                 I CERTIFY THE NEED FOR THESE SERVICES FURNISHED UNDER        THIS PLAN OF TREATMENT AND WHILE UNDER MY CARE .             Physician Signature               Date    X_____________________________________________________                         Certification Date From:  07/30/22   Certification Date To:  09/23/22    Referring Provider:  Melanie Saez    Initial Assessment        See Epic Evaluation SOC Date: 06/14/22

## 2022-07-29 NOTE — PROGRESS NOTES
PROGRESS  REPORT    Progress reporting period is from IE to 7/29/22.       SUBJECTIVE  Subjective changes noted by patient:     Reports overall is better. Still having stiffness, especially after prolonged sitting. Still  difficulty with doing stairs, not able to do reciprocally with ease, needs to rely on rail to help pull up, or do one at a time.Descending stairs more difficult. Walking is going good. Reports minimal to no pain, just stiffness. Has follow up with MD on 8/1/22.    Current Pain level: 0/10.      Initial Pain level: 3/10.   Changes in function:  Yes (See Goal flowsheet attached for changes in current functional level)  Adverse reaction to treatment or activity: None    OBJECTIVE  Changes noted in objective findings:   Objective: Left knee AROM 0-121; PROM 125.Can ascend 6 inch step with increased UE assist; unable to descend without increased substitution at hip. Ambulates with good gait pattern without assistive device.       ASSESSMENT/PLAN  Updated problem list and treatment plan: Diagnosis 1:  S/p left TKA  Decreased ROM/flexibility - manual therapy, therapeutic exercise and home program  Decreased joint mobility - manual therapy, therapeutic exercise and home program  Decreased strength - therapeutic exercise, therapeutic activities and home program  Impaired balance - neuro re-education, therapeutic activities and home program  Impaired gait - gait training and home program  STG/LTGs have been met or progress has been made towards goals:  Yes (See Goal flow sheet completed today.)  Assessment of Progress: The patient's condition is improving.  Patient is meeting short term goals and is progressing towards long term goals.  Self Management Plans:  Patient has been instructed in a home treatment program.  Patient  has been instructed in self management of symptoms.  I have re-evaluated this patient and find that the nature, scope, duration and intensity of the therapy is appropriate for the  medical condition of the patient.  Susanne continues to require the following intervention to meet STG and LTG's:  PT    Recommendations:  This patient would benefit from continued therapy.     Frequency:  1 X week, once daily  Duration:  for 8 weeks        Please refer to the daily flowsheet for treatment today, total treatment time and time spent performing 1:1 timed codes.

## 2022-07-31 ENCOUNTER — HEALTH MAINTENANCE LETTER (OUTPATIENT)
Age: 77
End: 2022-07-31

## 2022-08-04 ENCOUNTER — THERAPY VISIT (OUTPATIENT)
Dept: PHYSICAL THERAPY | Facility: CLINIC | Age: 77
End: 2022-08-04
Payer: MEDICARE

## 2022-08-04 DIAGNOSIS — Z47.89 AFTERCARE FOLLOWING SURGERY OF THE MUSCULOSKELETAL SYSTEM: ICD-10-CM

## 2022-08-04 DIAGNOSIS — M25.562 ACUTE PAIN OF LEFT KNEE: Primary | ICD-10-CM

## 2022-08-04 PROCEDURE — 97530 THERAPEUTIC ACTIVITIES: CPT | Mod: GP | Performed by: PHYSICAL THERAPIST

## 2022-08-04 PROCEDURE — 97112 NEUROMUSCULAR REEDUCATION: CPT | Mod: GP | Performed by: PHYSICAL THERAPIST

## 2022-08-04 PROCEDURE — 97110 THERAPEUTIC EXERCISES: CPT | Mod: GP | Performed by: PHYSICAL THERAPIST

## 2022-08-11 ENCOUNTER — THERAPY VISIT (OUTPATIENT)
Dept: PHYSICAL THERAPY | Facility: CLINIC | Age: 77
End: 2022-08-11
Payer: MEDICARE

## 2022-08-11 DIAGNOSIS — Z47.89 AFTERCARE FOLLOWING SURGERY OF THE MUSCULOSKELETAL SYSTEM: ICD-10-CM

## 2022-08-11 DIAGNOSIS — M25.562 ACUTE PAIN OF LEFT KNEE: Primary | ICD-10-CM

## 2022-08-11 PROCEDURE — 97110 THERAPEUTIC EXERCISES: CPT | Mod: GP | Performed by: PHYSICAL THERAPIST

## 2022-08-11 PROCEDURE — 97112 NEUROMUSCULAR REEDUCATION: CPT | Mod: GP | Performed by: PHYSICAL THERAPIST

## 2022-08-11 PROCEDURE — 97530 THERAPEUTIC ACTIVITIES: CPT | Mod: GP | Performed by: PHYSICAL THERAPIST

## 2022-08-18 ENCOUNTER — THERAPY VISIT (OUTPATIENT)
Dept: PHYSICAL THERAPY | Facility: CLINIC | Age: 77
End: 2022-08-18
Payer: MEDICARE

## 2022-08-18 DIAGNOSIS — Z47.89 AFTERCARE FOLLOWING SURGERY OF THE MUSCULOSKELETAL SYSTEM: ICD-10-CM

## 2022-08-18 DIAGNOSIS — M25.562 ACUTE PAIN OF LEFT KNEE: Primary | ICD-10-CM

## 2022-08-18 PROCEDURE — 97530 THERAPEUTIC ACTIVITIES: CPT | Mod: GP | Performed by: PHYSICAL THERAPIST

## 2022-08-18 PROCEDURE — 97112 NEUROMUSCULAR REEDUCATION: CPT | Mod: 59 | Performed by: PHYSICAL THERAPIST

## 2022-08-18 PROCEDURE — 97110 THERAPEUTIC EXERCISES: CPT | Mod: 59 | Performed by: PHYSICAL THERAPIST

## 2022-08-25 ENCOUNTER — THERAPY VISIT (OUTPATIENT)
Dept: PHYSICAL THERAPY | Facility: CLINIC | Age: 77
End: 2022-08-25
Payer: MEDICARE

## 2022-08-25 DIAGNOSIS — M25.562 ACUTE PAIN OF LEFT KNEE: Primary | ICD-10-CM

## 2022-08-25 DIAGNOSIS — Z47.89 AFTERCARE FOLLOWING SURGERY OF THE MUSCULOSKELETAL SYSTEM: ICD-10-CM

## 2022-08-25 PROCEDURE — 97110 THERAPEUTIC EXERCISES: CPT | Mod: 59 | Performed by: PHYSICAL THERAPIST

## 2022-08-25 PROCEDURE — 97112 NEUROMUSCULAR REEDUCATION: CPT | Mod: 59 | Performed by: PHYSICAL THERAPIST

## 2022-08-25 PROCEDURE — 97530 THERAPEUTIC ACTIVITIES: CPT | Mod: GP | Performed by: PHYSICAL THERAPIST

## 2022-08-25 ASSESSMENT — ACTIVITIES OF DAILY LIVING (ADL)
STIFFNESS: I HAVE THE SYMPTOM BUT IT DOES NOT AFFECT MY ACTIVITY
RAW_SCORE: 56
SWELLING: I HAVE THE SYMPTOM BUT IT DOES NOT AFFECT MY ACTIVITY
GO DOWN STAIRS: ACTIVITY IS SOMEWHAT DIFFICULT
STAND: ACTIVITY IS MINIMALLY DIFFICULT
KNEE_ACTIVITY_OF_DAILY_LIVING_SUM: 56
GIVING WAY, BUCKLING OR SHIFTING OF KNEE: I DO NOT HAVE THE SYMPTOM
RISE FROM A CHAIR: ACTIVITY IS NOT DIFFICULT
GO UP STAIRS: ACTIVITY IS MINIMALLY DIFFICULT
WALK: ACTIVITY IS NOT DIFFICULT
SQUAT: ACTIVITY IS SOMEWHAT DIFFICULT
KNEEL ON THE FRONT OF YOUR KNEE: I AM UNABLE TO DO THE ACTIVITY
HOW_WOULD_YOU_RATE_THE_OVERALL_FUNCTION_OF_YOUR_KNEE_DURING_YOUR_USUAL_DAILY_ACTIVITIES?: NEARLY NORMAL
HOW_WOULD_YOU_RATE_THE_CURRENT_FUNCTION_OF_YOUR_KNEE_DURING_YOUR_USUAL_DAILY_ACTIVITIES_ON_A_SCALE_FROM_0_TO_100_WITH_100_BEING_YOUR_LEVEL_OF_KNEE_FUNCTION_PRIOR_TO_YOUR_INJURY_AND_0_BEING_THE_INABILITY_TO_PERFORM_ANY_OF_YOUR_USUAL_DAILY_ACTIVITIES?: 70
PAIN: I DO NOT HAVE THE SYMPTOM
KNEE_ACTIVITY_OF_DAILY_LIVING_SCORE: 80
AS_A_RESULT_OF_YOUR_KNEE_INJURY,_HOW_WOULD_YOU_RATE_YOUR_CURRENT_LEVEL_OF_DAILY_ACTIVITY?: NEARLY NORMAL
LIMPING: I DO NOT HAVE THE SYMPTOM
WEAKNESS: I HAVE THE SYMPTOM BUT IT DOES NOT AFFECT MY ACTIVITY
SIT WITH YOUR KNEE BENT: ACTIVITY IS NOT DIFFICULT

## 2022-08-25 NOTE — LETTER
"[facility]IL[facility] [place]  [address]  [facility] [facility] [address]  [phone]    2022    Re: Susanne Escoto   :   1945  MRN:  [id number]   REFERRING PHYSICIAN:   Melanie CARRASCO [facility]  Date of Initial Evaluation:  22  Visits:  Rxs Used: 16  Reason for Referral: Acute pain of left knee; Aftercare following surgery of the musculoskeletal system      DISCHARGE REPORT  Progress reporting period is from IE to 2022.       SUBJECTIVE  Subjective changes noted by patient: Subjective: Knee still gets stiff after sitting otherwise doing well.  Doing half up stairs normal pattern, occ few down stairs normal.  Able to do longer walks and able to mow yard    Current pain level is  Current Pain level: 0/10.     Previous pain level was   Initial Pain level: 3/10.   Changes in function:  Yes (See Goal flowsheet attached for changes in current functional level)  Adverse reaction to treatment or activity: None  Knee Activity of Daily Living Score: 80            OBJECTIVE  Changes noted in objective findings:  Objective: ROM: 0-0-125 heel slide.  Good control 6\" step.  SLS 20sec     ASSESSMENT/PLAN  Updated problem list and treatment plan: Diagnosis 1:  S/P L TKA  Pain -  home program  Decreased ROM/flexibility - home program  Decreased strength - home program  Decreased proprioception - home program  Impaired gait - home program  Impaired muscle performance - home program  Decreased function - home program  STG/LTGs have been met or progress has been made towards goals:  Yes (See Goal flow sheet completed today.)  Assessment of Progress: The patient's condition is improving.  Self Management Plans:  Patient is independent in a home treatment program.  Patient is independent in self management of symptoms.  I have re-evaluated this patient and find that the nature, scope, duration and intensity of " the therapy is appropriate for the medical condition of the patient.  Susanne continues to require the following intervention to meet STG and LTG's:  PT intervention is no longer required to meet STG/LTG.    Recommendations:  This patient is ready to be discharged from therapy and continue their home treatment program.    Thank you for your referral.    INQUIRIES  Therapist: Guy Nj [address] [address]  Phone: [phone] /  Fax: [phone]

## 2022-08-25 NOTE — PROGRESS NOTES
"Subjective:  HPI  Physical Exam       Knee Activity of Daily Living Score: 80            Objective:  System    Physical Exam    General     ROS    Assessment/Plan:    DISCHARGE REPORT    Progress reporting period is from IE to 8/25/2022.       SUBJECTIVE  Subjective changes noted by patient:  .  Subjective: Knee still gets stiff after sitting otherwise doing well.  Doing half up stairs normal pattern, occ few down stairs normal.  Able to do longer walks and able to mow yard    Current pain level is  Current Pain level: 0/10.     Previous pain level was   Initial Pain level: 3/10.   Changes in function:  Yes (See Goal flowsheet attached for changes in current functional level)  Adverse reaction to treatment or activity: None    OBJECTIVE  Changes noted in objective findings:    Objective: ROM: 0-0-125 heel slide.  Good control 6\" step.  SLS 20sec     ASSESSMENT/PLAN  Updated problem list and treatment plan: Diagnosis 1:  S/P L TKA  Pain -  home program  Decreased ROM/flexibility - home program  Decreased strength - home program  Decreased proprioception - home program  Impaired gait - home program  Impaired muscle performance - home program  Decreased function - home program  STG/LTGs have been met or progress has been made towards goals:  Yes (See Goal flow sheet completed today.)  Assessment of Progress: The patient's condition is improving.  Self Management Plans:  Patient is independent in a home treatment program.  Patient is independent in self management of symptoms.  I have re-evaluated this patient and find that the nature, scope, duration and intensity of the therapy is appropriate for the medical condition of the patient.  Susanne continues to require the following intervention to meet STG and LTG's:  PT intervention is no longer required to meet STG/LTG.    Recommendations:  This patient is ready to be discharged from therapy and continue their home treatment program.    Please refer to the daily flowsheet " for treatment today, total treatment time and time spent performing 1:1 timed codes.

## 2022-10-15 ENCOUNTER — HEALTH MAINTENANCE LETTER (OUTPATIENT)
Age: 77
End: 2022-10-15

## 2022-12-03 ENCOUNTER — HEALTH MAINTENANCE LETTER (OUTPATIENT)
Age: 77
End: 2022-12-03

## 2024-01-13 ENCOUNTER — HEALTH MAINTENANCE LETTER (OUTPATIENT)
Age: 79
End: 2024-01-13

## 2024-04-02 ENCOUNTER — TRANSFERRED RECORDS (OUTPATIENT)
Dept: HEALTH INFORMATION MANAGEMENT | Facility: CLINIC | Age: 79
End: 2024-04-02
Payer: MEDICARE

## 2025-01-25 ENCOUNTER — HEALTH MAINTENANCE LETTER (OUTPATIENT)
Age: 80
End: 2025-01-25

## (undated) DEVICE — GLOVE PROTEXIS POWDER FREE 8.5 ORTHOPEDIC 2D73ET85

## (undated) DEVICE — Device

## (undated) DEVICE — NDL 21GA 1.5"

## (undated) DEVICE — DRAPE SHEET REV FOLD 3/4 9349

## (undated) DEVICE — MANIFOLD NEPTUNE 4 PORT 700-20

## (undated) DEVICE — SU VICRYL 0 CTX CR 8X18" J764D

## (undated) DEVICE — GLOVE PROTEXIS W/NEU-THERA 8.5  2D73TE85

## (undated) DEVICE — SOLUTION WOUND CLEANSING 3/4OZ 10% PVP EA-L3011FB-50

## (undated) DEVICE — PACK TOTAL KNEE SOP15TKFSD

## (undated) DEVICE — LINEN TOWEL PACK X5 5464

## (undated) DEVICE — BLADE SAW SAGITTAL STRK 19.5X90X1.27MM 2108-109-000S11

## (undated) DEVICE — HOOD FLYTE W/PEELAWAY 408-800-100

## (undated) DEVICE — SU ETHIBOND 0 CTX CR  8X18" CX31D

## (undated) DEVICE — ESU BIPOLAR SEALER AQUAMANTYS 6MM 23-112-1

## (undated) DEVICE — BONE CLEANING TIP INTERPULSE  0210-010-000

## (undated) DEVICE — DRSG ABDOMINAL 07 1/2X8" 7197D

## (undated) DEVICE — SU MONOCRYL 3-0 PS-2 27" Y427H

## (undated) DEVICE — DRAPE IOBAN INCISE 23X17" 6650EZ

## (undated) DEVICE — SOL WATER IRRIG 1000ML BOTTLE 2F7114

## (undated) DEVICE — SU STRATAFIX PDS PLUS 2-0 SPIRAL CT-1 30CM SXPP1B410

## (undated) DEVICE — CAST PADDING 6" UNSTERILE 9046

## (undated) DEVICE — CLOSURE SYS SKIN PREMIERPRO EXOFIN FUSION 4X22CM STRL 3472

## (undated) DEVICE — SOL NACL 0.9% INJ 250ML BAG 2B1322Q

## (undated) DEVICE — SOL NACL 0.9% INJ 1000ML BAG 2B1324X

## (undated) DEVICE — BLADE SAW SAGITTAL STRK 19.5X95X1.27MM 2108-109-000S15

## (undated) DEVICE — SYR 50ML LL W/O NDL 309653

## (undated) DEVICE — BONE CEMENT MIXEVAC III HI VAC KIT  0206-015-000

## (undated) DEVICE — GLOVE PROTEXIS W/NEU-THERA 6.5  2D73TE65

## (undated) DEVICE — SU STRATAFIX PDS PLUS 1 CT-1 18" SXPP1A404

## (undated) DEVICE — CATH TRAY FOLEY SURESTEP 16FR WDRAIN BAG STLK LATEX A300316A

## (undated) DEVICE — WRAP EZY KNEE

## (undated) DEVICE — GLOVE PROTEXIS BLUE W/NEU-THERA 8.0  2D73EB80

## (undated) DEVICE — SUCTION IRR SYSTEM W/O TIP INTERPULSE HANDPIECE 0210-100-000

## (undated) DEVICE — ESU GROUND PAD UNIVERSAL W/O CORD

## (undated) DEVICE — GLOVE PROTEXIS BLUE W/NEU-THERA 8.5  2D73EB85

## (undated) DEVICE — BLADE SAW SAGITTAL STRK 18X90X1.27MM HD SYS 6 6118-127-090

## (undated) DEVICE — GLOVE PROTEXIS W/NEU-THERA 8.0  2D73TE80

## (undated) DEVICE — GLOVE PROTEXIS MICRO 6.5  2D73PM65

## (undated) DEVICE — GLOVE PROTEXIS BLUE W/NEU-THERA 7.0  2D73EB70

## (undated) DEVICE — ESU PENCIL W/SMOKE EVAC CVPLP2000

## (undated) DEVICE — PREP CHLORAPREP 26ML TINTED ORANGE  260815

## (undated) DEVICE — DRAPE STERI U 1015

## (undated) DEVICE — GLOVE PROTEXIS POWDER FREE 7.0 ORTHOPEDIC 2D73ET70

## (undated) DEVICE — BLADE CLIPPER 4412A

## (undated) DEVICE — BLADE SAW RECIP STRK 70X12.5X1.2MM 0277-096-281

## (undated) DEVICE — GLOVE PROTEXIS POWDER FREE 7.5 ORTHOPEDIC 2D73ET75

## (undated) DEVICE — SOL NACL 0.9% IRRIG 1000ML BOTTLE 2F7124

## (undated) DEVICE — BNDG ELASTIC 6"X5YDS UNSTERILE 6611-60

## (undated) DEVICE — GLOVE PROTEXIS BLUE W/NEU-THERA 7.5  2D73EB75

## (undated) DEVICE — SU VICRYL 2-0 CP-1 27" UND J266H

## (undated) RX ORDER — CEFAZOLIN SODIUM 1 G/3ML
INJECTION, POWDER, FOR SOLUTION INTRAMUSCULAR; INTRAVENOUS
Status: DISPENSED
Start: 2019-11-21

## (undated) RX ORDER — CELECOXIB 200 MG/1
CAPSULE ORAL
Status: DISPENSED
Start: 2019-11-21

## (undated) RX ORDER — DEXAMETHASONE SODIUM PHOSPHATE 4 MG/ML
INJECTION, SOLUTION INTRA-ARTICULAR; INTRALESIONAL; INTRAMUSCULAR; INTRAVENOUS; SOFT TISSUE
Status: DISPENSED
Start: 2019-11-21

## (undated) RX ORDER — FENTANYL CITRATE 50 UG/ML
INJECTION, SOLUTION INTRAMUSCULAR; INTRAVENOUS
Status: DISPENSED
Start: 2022-06-09

## (undated) RX ORDER — PREGABALIN 150 MG/1
CAPSULE ORAL
Status: DISPENSED
Start: 2019-11-21

## (undated) RX ORDER — FENTANYL CITRATE 0.05 MG/ML
INJECTION, SOLUTION INTRAMUSCULAR; INTRAVENOUS
Status: DISPENSED
Start: 2019-11-21

## (undated) RX ORDER — BUPIVACAINE HYDROCHLORIDE 5 MG/ML
INJECTION, SOLUTION EPIDURAL; INTRACAUDAL
Status: DISPENSED
Start: 2019-11-21

## (undated) RX ORDER — ACETAMINOPHEN 325 MG/1
TABLET ORAL
Status: DISPENSED
Start: 2022-06-09

## (undated) RX ORDER — TRANEXAMIC ACID 650 MG/1
TABLET ORAL
Status: DISPENSED
Start: 2022-06-09

## (undated) RX ORDER — CEFAZOLIN SODIUM 2 G/100ML
INJECTION, SOLUTION INTRAVENOUS
Status: DISPENSED
Start: 2019-11-21

## (undated) RX ORDER — PROPOFOL 10 MG/ML
INJECTION, EMULSION INTRAVENOUS
Status: DISPENSED
Start: 2022-06-09

## (undated) RX ORDER — VANCOMYCIN HYDROCHLORIDE 1 G/20ML
INJECTION, POWDER, LYOPHILIZED, FOR SOLUTION INTRAVENOUS
Status: DISPENSED
Start: 2022-06-09

## (undated) RX ORDER — LIDOCAINE HYDROCHLORIDE 20 MG/ML
INJECTION, SOLUTION EPIDURAL; INFILTRATION; INTRACAUDAL; PERINEURAL
Status: DISPENSED
Start: 2022-06-09

## (undated) RX ORDER — VANCOMYCIN HYDROCHLORIDE 1 G/20ML
INJECTION, POWDER, LYOPHILIZED, FOR SOLUTION INTRAVENOUS
Status: DISPENSED
Start: 2019-11-21

## (undated) RX ORDER — PROPOFOL 10 MG/ML
INJECTION, EMULSION INTRAVENOUS
Status: DISPENSED
Start: 2019-11-21

## (undated) RX ORDER — KETOROLAC TROMETHAMINE 30 MG/ML
INJECTION, SOLUTION INTRAMUSCULAR; INTRAVENOUS
Status: DISPENSED
Start: 2019-11-21

## (undated) RX ORDER — LIDOCAINE HYDROCHLORIDE 20 MG/ML
INJECTION, SOLUTION EPIDURAL; INFILTRATION; INTRACAUDAL; PERINEURAL
Status: DISPENSED
Start: 2019-11-21

## (undated) RX ORDER — ACETAMINOPHEN 500 MG
TABLET ORAL
Status: DISPENSED
Start: 2019-11-21

## (undated) RX ORDER — ACYCLOVIR 200 MG/1
CAPSULE ORAL
Status: DISPENSED
Start: 2019-11-21

## (undated) RX ORDER — EPINEPHRINE 1 MG/ML
INJECTION, SOLUTION INTRAMUSCULAR; SUBCUTANEOUS
Status: DISPENSED
Start: 2019-11-21

## (undated) RX ORDER — CEFAZOLIN SODIUM/WATER 2 G/20 ML
SYRINGE (ML) INTRAVENOUS
Status: DISPENSED
Start: 2022-06-09

## (undated) RX ORDER — PREGABALIN 150 MG/1
CAPSULE ORAL
Status: DISPENSED
Start: 2022-06-09

## (undated) RX ORDER — ONDANSETRON 2 MG/ML
INJECTION INTRAMUSCULAR; INTRAVENOUS
Status: DISPENSED
Start: 2019-11-21

## (undated) RX ORDER — ONDANSETRON 2 MG/ML
INJECTION INTRAMUSCULAR; INTRAVENOUS
Status: DISPENSED
Start: 2022-06-09